# Patient Record
Sex: MALE | Race: WHITE | NOT HISPANIC OR LATINO | Employment: FULL TIME | URBAN - METROPOLITAN AREA
[De-identification: names, ages, dates, MRNs, and addresses within clinical notes are randomized per-mention and may not be internally consistent; named-entity substitution may affect disease eponyms.]

---

## 2018-02-10 ENCOUNTER — TRANSCRIBE ORDERS (OUTPATIENT)
Dept: FAMILY MEDICINE CLINIC | Facility: CLINIC | Age: 38
End: 2018-02-10

## 2020-07-09 ENCOUNTER — HOSPITAL ENCOUNTER (EMERGENCY)
Facility: HOSPITAL | Age: 40
Discharge: HOME | End: 2020-07-09
Attending: PLASTIC SURGERY | Admitting: PLASTIC SURGERY
Payer: OTHER MISCELLANEOUS

## 2020-07-09 VITALS
HEART RATE: 80 BPM | RESPIRATION RATE: 18 BRPM | OXYGEN SATURATION: 98 % | DIASTOLIC BLOOD PRESSURE: 104 MMHG | TEMPERATURE: 97.5 F | SYSTOLIC BLOOD PRESSURE: 169 MMHG | WEIGHT: 240.52 LBS

## 2020-07-09 PROCEDURE — 99283 EMERGENCY DEPT VISIT LOW MDM: CPT

## 2020-07-09 RX ORDER — GINSENG 100 MG
1 CAPSULE ORAL ONCE
Status: COMPLETED | OUTPATIENT
Start: 2020-07-09 | End: 2020-07-09

## 2020-07-09 RX ADMIN — BACITRACIN ZINC 1 SMALL APPLICATION: 500 OINTMENT TOPICAL at 16:42

## 2020-07-09 NOTE — PROGRESS NOTES
The patient was seen in [place] after he had the injured his right postauricular area on piece of duct work they turned around and cut him in the ear  There was no loss of tissue  cartilage was exposed wound was clean and repair this time he will have minimal scarring  He will return to my office on Tuesday 21st of July for follow-up

## 2020-07-09 NOTE — OP NOTE
OPERATIVE REPORT  PATIENT NAME: Isis Larios    :  1980  MRN: [id number]  Pt Location: [facility] Emergency Room    SURGERY DATE:  2020    Surgeons:  DYLAN Gongora B S, P E  Preop and postoperative Diagnosis:  Right postauricular laceration    Operative procedure:  Repair right postauricular laceration    Operative history: At work today a piece of duct work cut him in the right postauricular area creating a 15 mm laceration down to cartilage  There was no loss of tissue  Operative procedure: The patient was placed supine on the table  He was prepped and draped in usual fashion  Anesthesia was local using xylocaine 1% with epinephrine  The wound was clean  Was then  Four interrupted simple sutures  Dressings were applied  Tolerated procedure satisfactory        SIGNATURE: Derrek Koch MD  DATE: 2020  TIME: 4:59 PM

## 2020-07-09 NOTE — ED NOTES
Pt states he was at urgent care today and he received and updated tetanus shot during his visit     Daija Melo RN  07/09/20 6803

## 2020-09-30 ENCOUNTER — TELEMEDICINE (OUTPATIENT)
Dept: FAMILY MEDICINE CLINIC | Facility: CLINIC | Age: 40
End: 2020-09-30
Payer: COMMERCIAL

## 2020-09-30 VITALS — BODY MASS INDEX: 32.51 KG/M2 | HEIGHT: 72 IN | TEMPERATURE: 98.6 F | WEIGHT: 240 LBS

## 2020-09-30 DIAGNOSIS — Z20.828 EXPOSURE TO SARS-ASSOCIATED CORONAVIRUS: Primary | ICD-10-CM

## 2020-09-30 DIAGNOSIS — J40 BRONCHITIS: ICD-10-CM

## 2020-09-30 DIAGNOSIS — Z20.828 EXPOSURE TO SARS-ASSOCIATED CORONAVIRUS: ICD-10-CM

## 2020-09-30 PROCEDURE — 3725F SCREEN DEPRESSION PERFORMED: CPT | Performed by: FAMILY MEDICINE

## 2020-09-30 PROCEDURE — 99214 OFFICE O/P EST MOD 30 MIN: CPT | Performed by: FAMILY MEDICINE

## 2020-09-30 PROCEDURE — U0003 INFECTIOUS AGENT DETECTION BY NUCLEIC ACID (DNA OR RNA); SEVERE ACUTE RESPIRATORY SYNDROME CORONAVIRUS 2 (SARS-COV-2) (CORONAVIRUS DISEASE [COVID-19]), AMPLIFIED PROBE TECHNIQUE, MAKING USE OF HIGH THROUGHPUT TECHNOLOGIES AS DESCRIBED BY CMS-2020-01-R: HCPCS | Performed by: FAMILY MEDICINE

## 2020-09-30 PROCEDURE — 1036F TOBACCO NON-USER: CPT | Performed by: FAMILY MEDICINE

## 2020-09-30 RX ORDER — AZITHROMYCIN 250 MG/1
TABLET, FILM COATED ORAL
Qty: 6 TABLET | Refills: 0 | Status: SHIPPED | OUTPATIENT
Start: 2020-09-30 | End: 2020-10-04

## 2020-09-30 RX ORDER — IBUPROFEN 200 MG
TABLET ORAL EVERY 6 HOURS PRN
COMMUNITY

## 2020-09-30 NOTE — PROGRESS NOTES
Virtual Regular Visit      Assessment/Plan:    Problem List Items Addressed This Visit        Respiratory    Bronchitis    Relevant Medications    azithromycin (ZITHROMAX) 250 mg tablet       Other    Exposure to SARS-associated coronavirus - Primary    Relevant Orders    Novel Coronavirus (COVID-19), PCR [facility] - Collected at [facility] or [facility]          BMI Counseling: Body mass index is 32 55 kg/m²  The BMI is above normal  Nutrition recommendations include decreasing portion sizes  Exercise recommendations include exercising 3-5 times per week  No pharmacotherapy was ordered  Reason for visit is   Chief Complaint   Patient presents with    Cough     PT STARTED YESTERDAY MORNING WITH COUGH, SORE THROAT, HEADACHE, CHEST CONGESTION  AND BODY ACHES   NO FEVER    Virtual Regular Visit        Encounter provider Salina Tai DO    Provider located at [address] [place] [phone]      Recent Visits  No visits were found meeting these conditions  Showing recent visits within past 7 days and meeting all other requirements     Today's Visits  Date Type Provider Dept   09/30/20 Telemedicine DO [facility]   Showing today's visits and meeting all other requirements     Future Appointments  No visits were found meeting these conditions  Showing future appointments within next 150 days and meeting all other requirements        The patient was identified by name and date of birth  Catalina Prior was informed that this is a telemedicine visit and that the visit is being conducted through [facility] and patient was informed that this is a secure, HIPAA-compliant platform  He agrees to proceed     My office door was closed  No one else was in the room  He acknowledged consent and understanding of privacy and security of the video platform   The patient has agreed to participate and understands they can discontinue the visit at any time     Patient is aware this is a billable service  Subjective  Melissa Fabian is a 45 y o  male complains of symptoms starting yesterday, cough, sore throat, fever, body aches         Pt is seen for his initial visit by video visit  He is in his home and I am in my office  He complains of symptoms that started yesterday with a sore throat, cough, body aches  Has a fever-no great  His daughter was sick last week but is now better  The cough has some production of mucus  His appetite is okay  he denies any GI symptoms       History reviewed  No pertinent past medical history  Past Surgical History:   Procedure Laterality Date    SEPTOPLASTY         Current Outpatient Medications   Medication Sig Dispense Refill    ibuprofen (MOTRIN) 200 mg tablet Take by mouth every 6 (six) hours as needed for mild pain      azithromycin (ZITHROMAX) 250 mg tablet Take 2 tablets today then 1 tablet daily x 4 days 6 tablet 0     No current facility-administered medications for this visit  No Known Allergies    Review of Systems   Constitutional: Positive for fatigue and fever  HENT: Positive for congestion and sore throat  Eyes: Negative  Respiratory: Positive for cough  Negative for shortness of breath  Cardiovascular: Negative  Gastrointestinal: Negative  Endocrine: Negative  Genitourinary: Negative  Musculoskeletal: Positive for myalgias  Skin: Negative  Allergic/Immunologic: Negative  Neurological: Negative  Psychiatric/Behavioral: Negative  Video Exam    Vitals:    09/30/20 0805   Temp: 98 6 °F (37 °C)   Weight: 109 kg (240 lb)   Height: 6' (1 829 m)       Physical Exam  Vitals signs and nursing note reviewed  Constitutional:       Appearance: He is well-developed  HENT:      Head: Normocephalic and atraumatic     Eyes:      Conjunctiva/sclera: Conjunctivae normal    Pulmonary:      Effort: Pulmonary effort is normal    Neurological:      Mental Status: He is alert and oriented to person, place, and time  Psychiatric:         Behavior: Behavior normal          Thought Content: Thought content normal          Judgment: Judgment normal           I spent 13 minutes directly with the patient during this visit      Lyndseyfrancisca Burke acknowledges that he has consented to an online visit or consultation  He understands that the online visit is based solely on information provided by him, and that, in the absence of a face-to-face physical evaluation by the physician, the diagnosis he receives is both limited and provisional in terms of accuracy and completeness  This is not intended to replace a full medical face-to-face evaluation by the physician  Kenia Mckeon understands and accepts these terms

## 2020-10-01 LAB — SARS-COV-2 RNA SPEC QL NAA+PROBE: NOT DETECTED

## 2020-11-22 ENCOUNTER — AMB VIDEO VISIT (OUTPATIENT)
Dept: OTHER | Facility: HOSPITAL | Age: 40
End: 2020-11-22

## 2020-11-22 PROCEDURE — EVISIT: Performed by: FAMILY MEDICINE

## 2021-04-20 ENCOUNTER — IMMUNIZATIONS (OUTPATIENT)
Dept: FAMILY MEDICINE CLINIC | Facility: HOSPITAL | Age: 41
End: 2021-04-20

## 2021-04-20 DIAGNOSIS — Z23 ENCOUNTER FOR IMMUNIZATION: Primary | ICD-10-CM

## 2021-04-20 PROCEDURE — 91300 SARS-COV-2 / COVID-19 MRNA VACCINE (PFIZER-BIONTECH) 30 MCG: CPT

## 2021-04-20 PROCEDURE — 0001A SARS-COV-2 / COVID-19 MRNA VACCINE (PFIZER-BIONTECH) 30 MCG: CPT

## 2021-05-15 ENCOUNTER — IMMUNIZATIONS (OUTPATIENT)
Dept: FAMILY MEDICINE CLINIC | Facility: HOSPITAL | Age: 41
End: 2021-05-15

## 2021-05-15 DIAGNOSIS — Z23 ENCOUNTER FOR IMMUNIZATION: Primary | ICD-10-CM

## 2021-05-15 PROCEDURE — 0002A SARS-COV-2 / COVID-19 MRNA VACCINE (PFIZER-BIONTECH) 30 MCG: CPT

## 2021-05-15 PROCEDURE — 91300 SARS-COV-2 / COVID-19 MRNA VACCINE (PFIZER-BIONTECH) 30 MCG: CPT

## 2021-08-29 ENCOUNTER — OFFICE VISIT (OUTPATIENT)
Dept: URGENT CARE | Facility: CLINIC | Age: 41
End: 2021-08-29
Payer: COMMERCIAL

## 2021-08-29 VITALS
OXYGEN SATURATION: 95 % | HEART RATE: 84 BPM | WEIGHT: 230 LBS | HEIGHT: 72 IN | RESPIRATION RATE: 16 BRPM | BODY MASS INDEX: 31.15 KG/M2 | TEMPERATURE: 99.6 F

## 2021-08-29 DIAGNOSIS — B35.3 TINEA PEDIS OF LEFT FOOT: ICD-10-CM

## 2021-08-29 DIAGNOSIS — B34.9 VIRAL INFECTION: Primary | ICD-10-CM

## 2021-08-29 PROCEDURE — U0005 INFEC AGEN DETEC AMPLI PROBE: HCPCS | Performed by: PHYSICIAN ASSISTANT

## 2021-08-29 PROCEDURE — U0003 INFECTIOUS AGENT DETECTION BY NUCLEIC ACID (DNA OR RNA); SEVERE ACUTE RESPIRATORY SYNDROME CORONAVIRUS 2 (SARS-COV-2) (CORONAVIRUS DISEASE [COVID-19]), AMPLIFIED PROBE TECHNIQUE, MAKING USE OF HIGH THROUGHPUT TECHNOLOGIES AS DESCRIBED BY CMS-2020-01-R: HCPCS | Performed by: PHYSICIAN ASSISTANT

## 2021-08-29 PROCEDURE — G0382 LEV 3 HOSP TYPE B ED VISIT: HCPCS | Performed by: PHYSICIAN ASSISTANT

## 2021-08-29 RX ORDER — BENZONATATE 200 MG/1
200 CAPSULE ORAL 3 TIMES DAILY PRN
Qty: 20 CAPSULE | Refills: 0 | Status: SHIPPED | OUTPATIENT
Start: 2021-08-29

## 2021-08-29 NOTE — LETTER
August 29, 2021     Patient: Junior Aquino   YOB: 1980   Date of Visit: 8/29/2021       To Whom it May Concern:    Arturo Pablo was seen in my clinic on 8/29/2021  He may return to work on 09/06/2021  If labs are negative may return sooner  If you have any questions or concerns, please don't hesitate to call  Sincerely,          Linda Pantoja PA-C        CC: No Recipients

## 2021-08-29 NOTE — PATIENT INSTRUCTIONS
use cough medicine as directed   Motrin and/or Tylenol as needed for fevers aches and pains  Follow up with PCP in 3-5 days  Proceed to  ER if symptoms worsen  Cold Symptoms   AMBULATORY CARE:   Cold symptoms  include sneezing, dry throat, a stuffy nose, headache, watery eyes, and a cough  Your cough may be dry, or you may cough up mucus  You may also have muscle aches, joint pain, and tiredness  Rarely, you may have a fever  Cold symptoms occur from inflammation in your upper respiratory system caused by a virus  Most colds go away without treatment  Seek care immediately if:   · You have increased tiredness and weakness  · You are unable to eat  · Your heart is beating much faster than usual for you  · You see white spots in the back of your throat and your neck is swollen and sore to the touch  · You see pinpoint or larger reddish-purple dots on your skin  Contact your healthcare provider if:   · You have a fever higher than 102°F (38 9°C)  · You have new or worsening shortness of breath  · You have thick nasal drainage for more than 2 days  · Your symptoms do not improve or get worse within 5 days  · You have questions or concerns about your condition or care  Treatment for cold symptoms  may include NSAIDS to decrease muscle aches and fever  Cold medicines may also be given to decrease coughing, nasal stuffiness, sneezing, and a runny nose  Manage your cold symptoms: The following may help relieve cold symptoms, such as a dry throat and congestion:  · Gargle with mouthwash or warm salt water as directed  · Suck on throat lozenges or hard candy  · Use a cold or warm vaporizer or humidifier to ease your breathing  · Rest for at least 2 days and then as needed to decrease tiredness and weakness  · Use petroleum based jelly around your nostrils to decrease irritation from blowing your nose  · Drink plenty of liquids   Liquids will help thin and loosen thick mucus so you can cough it up  Liquids will also keep you hydrated  Ask your healthcare provider which liquids are best for you and how much to drink each day  Prevent the spread of germs  by washing your hands often  You can spread your cold germs to others for at least 3 days after your symptoms start  Do not share items, such as eating utensils  Cover your nose and mouth when you cough or sneeze using the crook of your elbow instead of your hands  Throw used tissues in the garbage  Do not smoke:  Smoking may worsen your symptoms and increase the length of time you feel sick  Talk with your healthcare provider if you need help to stop smoking  Follow up with your healthcare provider as directed:  Write down your questions so you remember to ask them during your visits  © Copyright [employer] 2021 Information is for End User's use only and may not be sold, redistributed or otherwise used for commercial purposes  All illustrations and images included in CareNotes® are the copyrighted property of [employer]  or [employer]   The above information is an  only  It is not intended as medical advice for individual conditions or treatments  Talk to your doctor, nurse or pharmacist before following any medical regimen to see if it is safe and effective for you  COVID-19 (Coronavirus Disease 2019)   AMBULATORY CARE:   Coronavirus disease 2019 (COVID-19)  is the disease caused by a coronavirus first discovered in December 2019  Coronaviruses generally cause upper respiratory (nose, throat, and lung) infections, such as a cold  The new virus spreads quickly and easily  The virus can be spread starting 2 days before symptoms even begin  The virus has also changed into several new forms (called variants) since it was discovered  The variants may be more contagious (easily spread) than the original form  Some may also cause more severe illness than others   It is important to follow local, national, and worldwide measures to protect yourself and others from infection  Signs and symptoms of COVID-19  may not develop  Signs and symptoms that develop usually start about 5 days after infection but can take 2 to 14 days  You may feel like you have the flu or a bad cold  Some signs and symptoms go away in a few days  Others can last weeks, months, or possibly years  Information on COVID-19 is still being learned  Tell your healthcare provider if you think you were infected but develop signs or symptoms not listed below:  · A cough    · Shortness of breath or trouble breathing that may become severe    · A fever of at least 100 4°F, or 38°C (may be lower in adults 65 or older)    · Chills that might include shaking    · Muscle pain, body aches, or a headache    · A sore throat    · Suddenly not being able to taste or smell anything    · Feeling mentally and physically tired (fatigue)    · Congestion (stuffy head and nose), or a runny nose    · Diarrhea, nausea, or vomiting    If you think you or someone you know may be infected:  Do the following to protect others:  · If emergency care is needed,  tell the  about the possible infection, or call ahead and tell the emergency department  · Call a healthcare provider  for instructions if symptoms are mild  Anyone who may be infected should not  arrive without calling first  The provider will need to protect staff members and other patients  · The person who may be infected needs to wear a face covering  while getting medical care  This will help lower the risk of infecting others  Coverings are not used for anyone who is younger than 2 years, has breathing problems, or cannot remove it  The provider can give you instructions for anyone who cannot wear a covering      Call your local emergency number [phone] in the [address] [place] [address],[address]) or an emergency department if:   · You have trouble breathing or shortness of breath at rest     · You have chest pain or pressure that lasts longer than 5 minutes  · You become confused or hard to wake  · Your lips or face are blue  · You have a fever of 104°F (40°C) or higher  Call your doctor if:   · You do not  have symptoms of COVID-19 but had close physical contact within 14 days with someone who tested positive  · You have questions or concerns about your condition or care  How COVID-19 is diagnosed: If you think you have COVID-19, call your healthcare provider  He or she will tell you what to do based on your symptoms and testing guidelines in your area  In general, the following may be used:  · A viral test  shows if you have a current infection  Samples are taken from your nose and throat, usually with swabs  You may need to wait several days to get the test results  Your healthcare provider will tell you how to get your results  You will need to quarantine (stay physically away from others) until you get your results  If results show you have COVID-19, you will need to continue until you are well  Your provider or other health official may give you more directions  You will also need to prevent another infection until it is known if you can get COVID-19 again  · An antibody test  shows if you had a past infection  Blood samples are used for this test  Antibodies are made by your immune system to attack the virus that causes COVID-19  Antibodies will form 1 to 3 weeks after you are infected  It is not known if antibodies prevent a second infection, or for how long a person might be protected  If you have antibodies, you will still need to be careful around others until more is known  · CT scans or x-rays  may be used to check for signs of pneumonia  The 2019 coronavirus causes a specific kind of pneumonia, usually in both lungs  The pictures may also be used to check for health problems in other parts of your body      Treatment  such as monoclonal antibodies and convalescent plasma have emergency use authorization (EUA)  This means they may be given only to patients who are hospitalized with severe signs and symptoms  The following may be used to manage your symptoms:  · Mild symptoms  may get better on their own  If you do not need to be treated in a hospital, you will be given instructions to use at home  Your condition will be closely monitored  You will need to watch for worsening symptoms and seek immediate care if needed  Talk to your healthcare provider about the following:    ? Decongestants  help reduce nasal congestion and help you breathe more easily  If you take decongestant pills, they may make you feel restless or cause problems with your sleep  Do not use decongestant sprays for more than a few days  ? Cough suppressants  help reduce coughing  Ask your healthcare provider which type of cough medicine is best for you  ? To soothe a sore throat,  gargle with warm salt water, or use throat lozenges or a throat spray  Drink more liquids to thin and loosen mucus and to prevent dehydration  ? NSAIDs or acetaminophen  can help lower a fever and relieve body aches or a headache  Follow directions  If not taken correctly, NSAIDs can cause kidney damage and acetaminophen can cause liver damage  · Severe or life-threatening symptoms  are treated in the hospital  You may need a combination of the following:    ? Medicines  may be given to lower or prevent inflammation or to fight the virus  You may also need blood thinners to prevent or treat blood clots  If you have a deep vein thrombosis (DVT) or pulmonary embolism (PE), you may need to keep using blood thinners for 3 months  ? Extra oxygen  may be given if you have respiratory failure  This means your lungs cannot get enough oxygen into your blood and out to your organs  Extra oxygen can help prevent organ failure  ? A ventilator  may be used to help you breathe      What you need to know about health problems the virus may cause:  Serious health problems may improve or continue for weeks, months, and possibly years  Health problems that continue may be called long COVID  Anyone can develop serious problems from this virus, but your risk is higher if you are 72 or older  A weak immune system, diabetes, or a heart or lung condition can also increase your risk  Your risk is also higher if you are a current or former cigarette smoker  COVID-19 can lead to any of the following:  · Serious lower respiratory conditions, such as pneumonia or acute respiratory distress syndrome (ARDS)    · Blood vessel damage, leading to blood clots    · Organ damage from a lack of oxygen or from blood clots    · Sleep problems    · Problems thinking clearly, remembering information, or concentrating    · Mood changes, depression, or anxiety    How the 2019 coronavirus spreads: The following are ways the virus is thought to spread, but more information may be coming:  · Droplets are the main way all coronaviruses spread  The virus travels in droplets that form when a person talks, coughs, or sneezes  The droplets can also float in the air for minutes or hours  Infection happens when you breathe in the droplets or get them in your eyes or nose  Close personal contact with an infected person increases your risk for infection  This means being within 6 feet (2 meters) of the person for at least 15 minutes over 24 hours  · Person-to-person contact can spread the virus  For example, a person with the virus on his or her hands can spread it by shaking hands with someone  · The virus can stay on objects and surfaces for a short time  You may become infected by touching the object or surface and then touching your eyes or mouth  · An infected animal may be able to infect a person who touches it  This may happen at live markets or on a farm      Help lower the risk for COVID-19:  The best way to prevent infection is to avoid anyone who is infected, but this can be hard to do  An infected person can spread the virus before signs or symptoms begin, or even if signs or symptoms never develop  The following can help lower the risk for infection:      · Wash your hands often throughout the day  Use soap and water  Rub your soapy hands together, lacing your fingers, for at least 20 seconds  Rinse with warm, running water  Dry your hands with a clean towel or paper towel  Use hand  that contains alcohol if soap and water are not available  Teach children how to wash their hands and use hand   · Cover sneezes and coughs  Turn your face away and cover your mouth and nose with a tissue  Throw the tissue away  Use the bend of your arm if a tissue is not available  Then wash your hands well with soap and water or use hand   Teach children how to cover a cough or sneeze  · Wear a face covering (mask) around anyone who does not live in your home  Use a cloth covering with at least 2 layers  You can also create layers by putting a cloth covering over a disposable non-medical mask  Cover your mouth and your nose  The covering should fit snugly against the bridge of your nose  Securely fasten it under your chin and on the sides of your face  Do not  wear a plastic face shield instead of a covering  Continue social distancing and washing your hands often  A face covering is not a substitute for social distancing safety measures  · Follow worldwide, national, and local social distancing guidelines  Keep at least 6 feet (2 meters) between you and others  Also keep this distance from anyone who comes to your home, such as someone making a delivery  Wear a face covering while you are around others  You will need to wear a covering in restaurants, stores, and other public buildings  You will also need a covering on mass transit, such as a bus, subway, or airplane  Remember to use a covering made from thick material or wear 2 coverings together      · Make a stand away from others  Only allow necessary people into your home  Wear your face covering, and remind others to wear a face covering  Remind them to wash their hands when they arrive and before they leave  Do not  let someone into your home or go to someone's home just to visit  Even if you both do not feel sick, the virus can pass from one of you to the other  · Avoid in-person gatherings and crowds  Gatherings or crowds of 10 or more individuals can cause the virus to spread  Avoid places such as [place], beaches, sporting events, and tourist attractions  For events such as parties, holiday meals, Yazidi services, and conferences, attend virtually (on a computer), if possible  · Ask your healthcare provider for other ways to have appointments  Some providers offer phone, video, or other types of appointments  You may also be able to get prescriptions for a few months of your medicines at a time  · Stay safe if you must go out to work  Keep physical distance between you and other workers as much as possible  Follow your employer's rules so everyone stays safe  If you have COVID-19 and are recovering at home,  healthcare providers will give you specific instructions to follow  The following are general guidelines to remind you how to keep others safe until you are well:  · Wash your hands often  Use soap and water as much as possible  Use hand  that contains alcohol if soap and water are not available  Dry your hands with a clean towel or paper towel  Do not share towels with anyone  If you use paper towels, throw them away in a lined trash can kept in your room or area  Use a covered trash can, if possible  · Do not go out of your home unless it is necessary  Ask someone who is not infected to go out for groceries or supplies, or have them delivered  Do not go to your healthcare provider's office without an appointment      · Only have close physical contact with a person giving direct care, or a baby or child you must care for  Family members and friends should not visit you  If possible, stay in a separate area or room of your home if you live with others  No one should go into the area or room except to give you care  You can visit with others by phone, video chat, e-mail, or similar systems  · Wear a face covering while others are near you  This can help prevent droplets from spreading the virus when you talk, sneeze, or cough  Put the covering on before anyone comes into your room or area  Remind the person to cover his or her nose and mouth before coming in to provide care for you  · Do not share items  Do not share dishes, towels, or other items with anyone  Items need to be washed after you use them  · Protect your baby  Some newborns have tested positive for the virus  It is not known if they became infected before or after birth  The highest risk is when a  has close contact with an infected person  If you are pregnant or breastfeeding, talk to your healthcare provider or obstetrician about any concerns you have  He or she will tell you when to bring your baby in for check-ups and vaccines  He or she will also tell you what to do if you think your baby was infected with the coronavirus  Wash your hands and put on a clean face covering before you breastfeed or care for your baby  · Do not handle live animals unless it is necessary  Some animals, including pets, have been infected with the new coronavirus  Ask someone who is not infected to take care of your pet until you are well  If you must care for a pet, wear a face covering  Wash your hands before and after you give care  Talk to your healthcare provider about how to keep a service animal safe, if needed  · Follow directions from your healthcare provider for being around others after you recover  It is not known if or for how long a recovered person can pass the virus to others   Your provider may give you instructions, such as continuing social distancing and wearing a face covering  He or she will tell you when it is okay to be around others again  This may be 10 to 20 days after symptoms started or you had a positive test  Most symptoms will also need to be gone  Your provider will give you more information  Follow up with your doctor as directed:  Write down your questions so you remember to ask them during your visits  For more information:   · Centers for Disease Control and Prevention  [address] , [address]  Phone: 8- 502 - 591-5164  Web Address: [place]     © [address] [place] [address] 2021 Information is for End User's use only and may not be sold, redistributed or otherwise used for commercial purposes  All illustrations and images included in CareNotes® are the copyrighted property of [employer] D A M , Inc  or [facility] Darren Cuellar   The above information is an  only  It is not intended as medical advice for individual conditions or treatments  Talk to your doctor, nurse or pharmacist before following any medical regimen to see if it is safe and effective for you  Athlete's Foot   WHAT YOU NEED TO KNOW:   Athlete's foot is a foot infection caused by a fungus  DISCHARGE INSTRUCTIONS:   Return to the emergency department if:   · You have a fever or chills  · You have red streaks going up your leg  Contact your healthcare provider if:   · Your infection spreads to other parts of your body  · Your infection is not better in 14 days or is not completely gone in 90 days  · The skin on your foot or leg is red and hot  · You have questions or concerns about your condition or care  Medicines:   · Antifungal medicine  may be given as a cream or pill  You may need a doctor's order for this medicine  Take the medicine until it is gone, even if your feet look like they are healed  · Take your medicine as directed    Contact your healthcare provider if you think your medicine is not helping or if you have side effects  Tell him or her if you are allergic to any medicine  Keep a list of the medicines, vitamins, and herbs you take  Include the amounts, and when and why you take them  Bring the list or the pill bottles to follow-up visits  Carry your medicine list with you in case of an emergency  Follow up with your healthcare provider as directed:  Write down your questions so you remember to ask them during your visits  Prevent the spread of athlete's foot:   · Prevent the spread of this infection to other parts of your body  When you shower, dry your groin area and other parts of your body before you dry your feet  · Keep your feet clean and dry  Wash your feet each day and dry them well, especially between your toes  After your feet are dry, put powder on your feet and between your toes  Wear clean cotton or wool socks each day  Put your socks on first so you do not spread the infection to other areas of your body  Wear sandals, canvas tennis shoes, or other shoes that allow air to flow to your feet  This helps keep your feet dry  Do not use shoes that are tight, or made of plastic or rubber  · Soak your feet in an astringent (drying) solution as directed  if you have blisters  You may need to do this for 20 to 30 minutes, 2 times each day to help dry out the blisters  · Wear shoes in public areas  Wear shower shoes or sandals in warm, damp areas  This includes shower stalls, near swimming pools, and locker rooms  Do not share socks or shoes  Do not use public swimming pools  © Copyright [employer] 2021 Information is for End User's use only and may not be sold, redistributed or otherwise used for commercial purposes  All illustrations and images included in CareNotes® are the copyrighted property of [employer]  or [employer]  The above information is an  only   It is not intended as medical advice for individual conditions or treatments  Talk to your doctor, nurse or pharmacist before following any medical regimen to see if it is safe and effective for you

## 2021-08-29 NOTE — PROGRESS NOTES
[facility] [employer] [facility]        NAME: Chapito Benitez is a 37 y o  male  : 1980    MRN: [id number]  DATE: 2021  TIME: 3:14 PM    Assessment and Plan   Viral infection [B34 9]  1  Viral infection  Novel Coronavirus (Covid-19),PCR [facility] - Office Collection    benzonatate (TESSALON) 200 MG capsule   2  Tinea pedis of left foot           Patient Instructions      use cough medicine as directed   Motrin and/or Tylenol as needed for fevers aches and pains  Follow up with PCP in 3-5 days  Proceed to  ER if symptoms worsen  Chief Complaint     Chief Complaint   Patient presents with    Cough     Monday pedicure, toe infection  Tuesday "athlete's foot", redness, spot on dorsal foot  Since Thursday/friday, cough HS when laying down  No cough during day, sore throat since friday 7/10 with swollowing  Denies fever, n/v/d, loss of taste, appetite, smell, CP, sob, chest pressure/tightness, fatique  Pt vaccinated covid  Denies sick contact or known covid exposure  Pt had pink eye tuesday  History of Present Illness        52-year-old male presents with 2 complaints  First is a cough that started on Friday night  Has been productive from time to time  Worse in the morning  Throat is irritated morning  Denies any loss of smell or taste  Has had occasional headaches  Denies any fevers or chills  No fatigue  Denies any body aches or pain  No abdominal pain nausea vomiting or diarrhea  Second complaint is a rash on left foot  Had a pedicure on Monday them rash rebound foot  Has had similar follow rashes on his foot  Been treating with over-the-counter topicals which has been improving  Denies any numbness or tingling    Cough  This is a new problem  The current episode started in the past 7 days  The problem has been waxing and waning  The problem occurs constantly  The cough is productive of sputum  Associated symptoms include headaches, a rash, rhinorrhea and a sore throat  Pertinent negatives include no chest pain, chills, fever, heartburn, myalgias, nasal congestion, postnasal drip, shortness of breath, weight loss or wheezing  Nothing aggravates the symptoms  He has tried nothing for the symptoms  The treatment provided no relief  Rash  This is a new problem  The current episode started in the past 7 days  The problem has been gradually improving since onset  Location: Leftt dorsal foot  The rash is characterized by pain, redness, itchiness and burning  He was exposed to nothing  Associated symptoms include coughing, rhinorrhea and a sore throat  Pertinent negatives include no congestion, diarrhea, fatigue, fever or shortness of breath  Past treatments include antibiotic cream  The treatment provided moderate relief  Review of Systems   Review of Systems   Constitutional: Negative  Negative for chills, fatigue, fever and weight loss  HENT: Positive for rhinorrhea and sore throat  Negative for congestion and postnasal drip  Eyes: Negative  Respiratory: Positive for cough  Negative for shortness of breath and wheezing  Cardiovascular: Negative  Negative for chest pain  Gastrointestinal: Negative  Negative for diarrhea and heartburn  Musculoskeletal: Negative  Negative for myalgias  Skin: Positive for rash  Neurological: Positive for headaches           Current Medications       Current Outpatient Medications:     benzonatate (TESSALON) 200 MG capsule, Take 1 capsule (200 mg total) by mouth 3 (three) times a day as needed for cough, Disp: 20 capsule, Rfl: 0    ibuprofen (MOTRIN) 200 mg tablet, Take by mouth every 6 (six) hours as needed for mild pain, Disp: , Rfl:     Current Allergies     Allergies as of 08/29/2021    (No Known Allergies)            The following portions of the patient's history were reviewed and updated as appropriate: allergies, current medications, past family history, past medical history, past social history, past surgical history and problem list      History reviewed  No pertinent past medical history  Past Surgical History:   Procedure Laterality Date    SEPTOPLASTY         History reviewed  No pertinent family history  Medications have been verified  Objective   Pulse 84   Temp 99 6 °F (37 6 °C)   Resp 16   Ht 6' (1 829 m)   Wt 104 kg (230 lb)   SpO2 95%   BMI 31 19 kg/m²   No LMP for male patient  Physical Exam     Physical Exam  Vitals and nursing note reviewed  Constitutional:       General: He is not in acute distress  Appearance: He is well-developed  HENT:      Head: Normocephalic and atraumatic  Right Ear: Hearing, tympanic membrane, ear canal and external ear normal       Left Ear: Hearing, tympanic membrane, ear canal and external ear normal       Nose: Nose normal       Mouth/Throat:      Pharynx: Uvula midline  No oropharyngeal exudate  Eyes:      General:         Right eye: No discharge  Left eye: No discharge  Conjunctiva/sclera: Conjunctivae normal    Cardiovascular:      Rate and Rhythm: Normal rate and regular rhythm  Heart sounds: Normal heart sounds  No murmur heard  Pulmonary:      Effort: Pulmonary effort is normal  No respiratory distress  Breath sounds: Normal breath sounds  No wheezing or rales  Abdominal:      General: Bowel sounds are normal       Palpations: Abdomen is soft  Tenderness: There is no abdominal tenderness  Musculoskeletal:         General: Normal range of motion  Cervical back: Normal range of motion and neck supple  Lymphadenopathy:      Cervical: No cervical adenopathy  Skin:     General: Skin is warm and dry  Findings: Rash ( erythematous red rash noted to dorsal aspect of Left foot) present  Neurological:      Mental Status: He is alert and oriented to person, place, and time

## 2021-08-29 NOTE — LETTER
August 29, 2021     Patient: Tanisha Rodrigez   YOB: 1980   Date of Visit: 8/29/2021       To Whom it May Concern:    Nicolas Elena was seen in my clinic on 8/29/2021  He may return to work on 08/06/2021  Patient return sooner if labs are negative  If you have any questions or concerns, please don't hesitate to call  Sincerely,          Dorothy Pantoja PA-C        CC: No Recipients

## 2021-08-30 LAB — SARS-COV-2 RNA RESP QL NAA+PROBE: NEGATIVE

## 2021-09-14 ENCOUNTER — OFFICE VISIT (OUTPATIENT)
Dept: URGENT CARE | Facility: CLINIC | Age: 41
End: 2021-09-14
Payer: COMMERCIAL

## 2021-09-14 VITALS
RESPIRATION RATE: 20 BRPM | TEMPERATURE: 103 F | HEART RATE: 117 BPM | HEIGHT: 72 IN | WEIGHT: 230 LBS | BODY MASS INDEX: 31.15 KG/M2 | OXYGEN SATURATION: 98 %

## 2021-09-14 DIAGNOSIS — J06.9 VIRAL URI: ICD-10-CM

## 2021-09-14 DIAGNOSIS — Z11.59 SPECIAL SCREENING EXAMINATION FOR UNSPECIFIED VIRAL DISEASE: Primary | ICD-10-CM

## 2021-09-14 PROCEDURE — G0382 LEV 3 HOSP TYPE B ED VISIT: HCPCS | Performed by: FAMILY MEDICINE

## 2021-09-14 PROCEDURE — U0003 INFECTIOUS AGENT DETECTION BY NUCLEIC ACID (DNA OR RNA); SEVERE ACUTE RESPIRATORY SYNDROME CORONAVIRUS 2 (SARS-COV-2) (CORONAVIRUS DISEASE [COVID-19]), AMPLIFIED PROBE TECHNIQUE, MAKING USE OF HIGH THROUGHPUT TECHNOLOGIES AS DESCRIBED BY CMS-2020-01-R: HCPCS | Performed by: FAMILY MEDICINE

## 2021-09-14 PROCEDURE — U0005 INFEC AGEN DETEC AMPLI PROBE: HCPCS | Performed by: FAMILY MEDICINE

## 2021-09-14 NOTE — PROGRESS NOTES
[facility] [employer] [facility]        NAME: Devyn Son is a 37 y o  male  : 1980    MRN: [id number]  DATE: 2021  TIME: 11:48 AM    Assessment and Plan   Special screening examination for unspecified viral disease [Z11 59]  1  Special screening examination for unspecified viral disease  Novel Coronavirus (Covid-19),PCR [facility] - Office Collection   2  Viral URI           Patient Instructions       Follow up with PCP in 3-5 days  Proceed to  ER if symptoms worsen  Chief Complaint     Chief Complaint   Patient presents with    Fever     chills body aches x 2 weeks, here 2 wks ago was tested and it was negative, was better and now everything is flaring up again         History of Present Illness         37year-old male reports waking up this morning fevers or chills and body aches  He states that he was tested for COVID-19 2 weeks ago was negative that time and his symptoms since then have resolved  Denies any nausea or vomiting  Denies any loss of taste or smell  Review of Systems   Review of Systems   Constitutional: Positive for chills, fatigue and fever  HENT: Negative  Eyes: Negative  Respiratory: Negative  Cardiovascular: Negative  Gastrointestinal: Negative  Genitourinary: Negative  Musculoskeletal: Positive for arthralgias and myalgias  Skin: Negative  Allergic/Immunologic: Negative  Neurological: Negative  Hematological: Negative  Psychiatric/Behavioral: Negative            Current Medications       Current Outpatient Medications:     ibuprofen (MOTRIN) 200 mg tablet, Take by mouth every 6 (six) hours as needed for mild pain, Disp: , Rfl:     benzonatate (TESSALON) 200 MG capsule, Take 1 capsule (200 mg total) by mouth 3 (three) times a day as needed for cough (Patient not taking: Reported on 2021), Disp: 20 capsule, Rfl: 0    Current Allergies     Allergies as of 2021    (No Known Allergies)            The following portions of the patient's history were reviewed and updated as appropriate: allergies, current medications, past family history, past medical history, past social history, past surgical history and problem list      History reviewed  No pertinent past medical history  Past Surgical History:   Procedure Laterality Date    SEPTOPLASTY         History reviewed  No pertinent family history  Medications have been verified  Objective   Pulse (!) 117   Temp (!) 103 °F (39 4 °C)   Resp 20   Ht 6' (1 829 m)   Wt 104 kg (230 lb)   SpO2 98%   BMI 31 19 kg/m²   No LMP for male patient  Physical Exam     Physical Exam  Constitutional:       Appearance: He is well-developed  HENT:      Head: Normocephalic  Nose: Congestion present  Eyes:      Pupils: Pupils are equal, round, and reactive to light  Cardiovascular:      Rate and Rhythm: Regular rhythm  Tachycardia present  Pulmonary:      Effort: Pulmonary effort is normal    Abdominal:      General: Abdomen is flat  Musculoskeletal:         General: Normal range of motion  Cervical back: Normal range of motion  Skin:     General: Skin is warm  Neurological:      Mental Status: He is alert and oriented to person, place, and time

## 2021-09-15 LAB — SARS-COV-2 RNA RESP QL NAA+PROBE: POSITIVE

## 2022-10-12 PROBLEM — J06.9 VIRAL URI: Status: RESOLVED | Noted: 2021-09-14 | Resolved: 2022-10-12

## 2024-06-10 ENCOUNTER — HOSPITAL ENCOUNTER (EMERGENCY)
Dept: HOSPITAL 99 - EMR | Age: 44
Discharge: HOME | End: 2024-06-10
Payer: COMMERCIAL

## 2024-06-10 VITALS — DIASTOLIC BLOOD PRESSURE: 79 MMHG | RESPIRATION RATE: 18 BRPM | SYSTOLIC BLOOD PRESSURE: 156 MMHG

## 2024-06-10 VITALS — RESPIRATION RATE: 18 BRPM

## 2024-06-10 DIAGNOSIS — S93.402A: Primary | ICD-10-CM
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
## 2024-06-10 PROCEDURE — 99283 EMERGENCY DEPT VISIT LOW MDM: CPT

## 2024-06-10 PROCEDURE — 29515 APPLICATION SHORT LEG SPLINT: CPT

## 2025-01-28 ENCOUNTER — HOSPITAL ENCOUNTER (OUTPATIENT)
Dept: HOSPITAL 99 - MRI | Age: 45
End: 2025-01-28
Payer: COMMERCIAL

## 2025-01-28 DIAGNOSIS — G89.29: ICD-10-CM

## 2025-01-28 DIAGNOSIS — M25.572: Primary | ICD-10-CM

## 2025-03-20 ENCOUNTER — EVALUATION (OUTPATIENT)
Dept: PHYSICAL THERAPY | Facility: CLINIC | Age: 45
End: 2025-03-20
Payer: COMMERCIAL

## 2025-03-20 DIAGNOSIS — M25.572 CHRONIC PAIN OF LEFT ANKLE: ICD-10-CM

## 2025-03-20 DIAGNOSIS — T14.8XXA BONE BRUISE: ICD-10-CM

## 2025-03-20 DIAGNOSIS — G89.29 CHRONIC PAIN OF LEFT ANKLE: ICD-10-CM

## 2025-03-20 DIAGNOSIS — S93.402D SPRAIN OF LEFT ANKLE, UNSPECIFIED LIGAMENT, SUBSEQUENT ENCOUNTER: Primary | ICD-10-CM

## 2025-03-20 PROCEDURE — 97110 THERAPEUTIC EXERCISES: CPT | Performed by: PHYSICAL THERAPIST

## 2025-03-20 PROCEDURE — 97162 PT EVAL MOD COMPLEX 30 MIN: CPT | Performed by: PHYSICAL THERAPIST

## 2025-03-20 NOTE — LETTER
2025    Quinn Parkinson MD  [address] [place] [id number]    Patient: Gabriel Jacques   YOB: 1980   Date of Visit: 3/20/2025     Encounter Diagnosis     ICD-10-CM    1. Sprain of left ankle, unspecified ligament, subsequent encounter  S93.402D       2. Bone bruise  T14.8XXA       3. Chronic pain of left ankle  M25.572     G89.29           Dear Dr. Parkinson:    Thank you for your recent referral of Gabriel Jacques. Please review the attached evaluation summary from Gabriel's recent visit.     Please verify that you agree with the plan of care by signing the attached order.     If you have any questions or concerns, please do not hesitate to call.     I sincerely appreciate the opportunity to share in the care of one of your patients and hope to have another opportunity to work with you in the near future.       Sincerely,    Tasha Chisholm, PT      Referring Provider:      I certify that I have read the below Plan of Care and certify the need for these services furnished under this plan of treatment while under my care.                    Quinn Parkinson MD  [address] [place] [id number]  Via Fax: [phone]          PT Evaluation     Today's date: 3/20/2025  Patient name: Gabriel Jacques  : 1980  MRN: [id number]  Referring provider: Quinn Parkinson MD  Dx:   Encounter Diagnosis     ICD-10-CM    1. Sprain of left ankle, unspecified ligament, subsequent encounter  S93.402D       2. Bone bruise  T14.8XXA       3. Chronic pain of left ankle  M25.572     G89.29                      Assessment  Impairments: abnormal coordination, abnormal gait, abnormal or restricted ROM, activity intolerance, impaired balance, impaired physical strength, lacks appropriate home exercise program, pain with function, weight-bearing intolerance, poor posture , poor body mechanics, unable to perform ADL, participation limitations, activity limitations and endurance  Symptom irritability:  moderate    Assessment details: Pt presents w/ hx hx L lateral ankle sprain w/ medial malleolus tibial bone bruising which initially occurred in June 2024, and then was re-injured in September 2024. He uses ankle brace most of the time when he's on his feet. He exhibits limited L moreso than R ankle DF ROM, contributing to abnormal biomechanics. He presents w/ excess L ankle inversion ROM, as expected. He presents w/ good strength in B LE, though mild deficits are noted on L ankle inversion, knee flexion and extension, and L hip flexion, all of which were likely limited by pain. He demonstrates impaired proprioception on L ankle, contributing to balance impairments. He would benefit from physical therapy to address these impairments and to maximize functional abilities.   Barriers to therapy: Chronicity and multiple injuries to L ankle  Understanding of Dx/Px/POC: good     Prognosis: good    Goals  STG (4 weeks):    Improve L ankle DF ROM to at least 10 degrees.  Decreased L ankle pain to at most 4/10.  Pt will be able to wean from ankle brace for walking community distances w/o ankle pain.    LTG (8-12-weeks):  Pt will be independent w/ HEP.  Pt will be able to navigate stairs w/o L ankle pain.  Decreased L ankle pain to at most 2/10.        Plan  Patient would benefit from: skilled physical therapy  Planned modality interventions: cryotherapy, TENS and thermotherapy: hydrocollator packs    Planned therapy interventions: abdominal trunk stabilization, activity modification, ADL retraining, balance/weight bearing training, balance, body mechanics training, behavior modification, flexibility, gait training, home exercise program, therapeutic exercise, therapeutic activities, stretching, strengthening, postural training, patient/caregiver education, neuromuscular re-education, nerve gliding, muscle pump exercises, motor coordination training, Mcduffie taping, massage, manual therapy, kinesiology taping, joint  mobilization and IASTM    Frequency: 1-2x week  Duration in weeks: 12  Plan of Care beginning date: 3/20/2025  Plan of Care expiration date: 2025  Treatment plan discussed with: patient  Plan details: Pt will be I in HEP and will be able to perform ADLs and work tasks w/ minimal to no L ankle pain.             Subjective Evaluation    History of Present Illness  Mechanism of injury: He had a grade 2 (per pt) L lateral ankle sprain in 2024 - rolled it in a hole in the grass. Saw doc, used brace, etc. and was healing. In September he stopped using brace. The week before thanksgiving he stepped off a curb and tweaked it. By the end of that week he was in brace again. Finally got MRI about a month ago, which showed bone bruise - no tears, per pt. Saw ortho who put him on a bone stimulator (~10 days ago x20 mins/day) and sent to PT. Pain is medial ankle (bone bruise). He goes to chiro 1x/week for general maintenance and to keep up w/ L ankle. Has TENS unit which helps when it's sore. Uses ibuprofen when he needs to. Wearing brace religiously when he's working/doing activity. 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 Prolonged walking hurts. But climbing stairs/ladders hurts, descending worse than ascending.   It's been getting better overall. Pain can be brief or it build up and then sticks around for a while.     Hx of low back and L hip pain which were prior to injury but are not helped due to compensation for ankle.  Quality of life: good    Patient Goals  Patient goals for therapy: decreased pain, improved balance, independence with ADLs/IADLs and return to sport/leisure activities  Patient goal: to have no pain  Pain  Current pain ratin  At best pain ratin  At worst pain ratin  Location: L ankle  Quality: sharp (feels like getting punched)  Relieving factors: medications, heat and ice (TENS; lidocaine patches)          Objective    Palpation:   TTP  L medial malleolus and ines-malleolus    A/PROM:   Left   Right  Knee flexion    WFL   WFL  Knee extension  WFL   WFL    Hip flexion   >90   >90  Hip extension   -10   -10   Hip IR    5*   15  Hip ER    WFL   WFL    Ankle DF   -5/5   0/5   Ankle PF   55   55   Ankle inversion  25   20   Ankle eversion  10   10    Strength:   Left   Right  Knee flex   4+/5*   5-/5  Knee ext   4+/5*   5-/5    Hip flex    4/5*   4+/5  Hip ER    4+/5   4+/5  Hip IR    4+/5   4+/5  Hip ABD   4+/5   4+/5  Hip ext    4/5*   4/5*    Ankle DF   4+/5   4+/5   Ankle PF   >3/5   >3/5   Ankle inversion  4/5*   4+/5   Ankle eversion  4+/5   4+/5     Flexibility:  Quads    +   +  Hamstrings   +   +  GS    +   +    Special Tests:    Valgus Stress   -   -  Varus Stress   +   -  Anterior Drawer   -   -  Posterior Drawer  -   -    Balance:   SLS - mild deficits on L compared to R    Function:   Ambulation - B excess pronation       POC Expires Reeval for Medicare to be completed  Unit Limit Auth Expiration Date PT/OT/STVisit Limit   6/12/25 By visit N/A N/A pending pending    Completed on visit                Precautions: hx L lateral ankle sprain w/ medial malleolus tibial bone bruising    Auth Status DATE 3/20       NA Visit # 1        Remaining        MANUAL THERAPY        Ankle JM        STM                                        THERAPEUTIC EXERCISE HEP        Patient education  Condition and POC       doming x 10x5 sec       GS stretch x 3x30 sec (standing/seated w/strap)                                                                                                                   NEUROMUSCULAR REEDUCATION          SLS x x3                                                                                                                            THERAPEUTIC ACTIVITY                                             GAIT TRAINING                                             MODALITIES

## 2025-03-20 NOTE — PROGRESS NOTES
PT Evaluation     Today's date: 3/20/2025  Patient name: Gabriel Jacques  : 1980  MRN: [id number]  Referring provider: Quinn Parkinson MD  Dx:   Encounter Diagnosis     ICD-10-CM    1. Sprain of left ankle, unspecified ligament, subsequent encounter  S93.402D       2. Bone bruise  T14.8XXA       3. Chronic pain of left ankle  M25.572     G89.29                      Assessment  Impairments: abnormal coordination, abnormal gait, abnormal or restricted ROM, activity intolerance, impaired balance, impaired physical strength, lacks appropriate home exercise program, pain with function, weight-bearing intolerance, poor posture , poor body mechanics, unable to perform ADL, participation limitations, activity limitations and endurance  Symptom irritability: moderate    Assessment details: Pt presents w/ hx hx L lateral ankle sprain w/ medial malleolus tibial bone bruising which initially occurred in 2024, and then was re-injured in 2024. He uses ankle brace most of the time when he's on his feet. He exhibits limited L moreso than R ankle DF ROM, contributing to abnormal biomechanics. He presents w/ excess L ankle inversion ROM, as expected. He presents w/ good strength in B LE, though mild deficits are noted on L ankle inversion, knee flexion and extension, and L hip flexion, all of which were likely limited by pain. He demonstrates impaired proprioception on L ankle, contributing to balance impairments. He would benefit from physical therapy to address these impairments and to maximize functional abilities.   Barriers to therapy: Chronicity and multiple injuries to L ankle  Understanding of Dx/Px/POC: good     Prognosis: good    Goals  STG (4 weeks):    Improve L ankle DF ROM to at least 10 degrees.  Decreased L ankle pain to at most 4/10.  Pt will be able to wean from ankle brace for walking community distances w/o ankle pain.    LTG (8-12-weeks):  Pt will be independent w/ HEP.  Pt will be able  to navigate stairs w/o L ankle pain.  Decreased L ankle pain to at most 2/10.        Plan  Patient would benefit from: skilled physical therapy  Planned modality interventions: cryotherapy, TENS and thermotherapy: hydrocollator packs    Planned therapy interventions: abdominal trunk stabilization, activity modification, ADL retraining, balance/weight bearing training, balance, body mechanics training, behavior modification, flexibility, gait training, home exercise program, therapeutic exercise, therapeutic activities, stretching, strengthening, postural training, patient/caregiver education, neuromuscular re-education, nerve gliding, muscle pump exercises, motor coordination training, Mcduffie taping, massage, manual therapy, kinesiology taping, joint mobilization and IASTM    Frequency: 1-2x week  Duration in weeks: 12  Plan of Care beginning date: 3/20/2025  Plan of Care expiration date: 6/12/2025  Treatment plan discussed with: patient  Plan details: Pt will be I in HEP and will be able to perform ADLs and work tasks w/ minimal to no L ankle pain.             Subjective Evaluation    History of Present Illness  Mechanism of injury: He had a grade 2 (per pt) L lateral ankle sprain in June 2024 - rolled it in a hole in the grass. Saw doc, used brace, etc. and was healing. In September he stopped using brace. The week before thanksgiving he stepped off a curb and tweaked it. By the end of that week he was in brace again. Finally got MRI about a month ago, which showed bone bruise - no tears, per pt. Saw ortho who put him on a bone stimulator (~10 days ago x20 mins/day) and sent to PT. Pain is medial ankle (bone bruise). He goes to chiro 1x/week for general maintenance and to keep up w/ L ankle. Has TENS unit which helps when it's sore. Uses ibuprofen when he needs to. Wearing brace religiously when he's working/doing activity. 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  Prolonged walking hurts. But climbing stairs/ladders hurts, descending worse than ascending.   It's been getting better overall. Pain can be brief or it build up and then sticks around for a while.     Hx of low back and L hip pain which were prior to injury but are not helped due to compensation for ankle.  Quality of life: good    Patient Goals  Patient goals for therapy: decreased pain, improved balance, independence with ADLs/IADLs and return to sport/leisure activities  Patient goal: to have no pain  Pain  Current pain ratin  At best pain ratin  At worst pain ratin  Location: L ankle  Quality: sharp (feels like getting punched)  Relieving factors: medications, heat and ice (TENS; lidocaine patches)          Objective    Palpation:   TTP L medial malleolus and ines-malleolus    A/PROM:   Left   Right  Knee flexion    WFL   WFL  Knee extension  WFL   WFL    Hip flexion   >90   >90  Hip extension   -10   -10   Hip IR    5*   15  Hip ER    WFL   WFL    Ankle DF   -5/5   0/5   Ankle PF   55   55   Ankle inversion  25   20   Ankle eversion  10   10    Strength:   Left   Right  Knee flex   4+/5*   5-/5  Knee ext   4+/5*   5-/5    Hip flex    4/5*   4+/5  Hip ER    4+/5   4+/5  Hip IR    4+/5   4+/5  Hip ABD   4+/5   4+/5  Hip ext    4/5*   4/5*    Ankle DF   4+/5   4+/5   Ankle PF   >3/5   >3/5   Ankle inversion  4/5*   4+/5   Ankle eversion  4+/5   4+/5     Flexibility:  Quads    +   +  Hamstrings   +   +  GS    +   +    Special Tests:    Valgus Stress   -   -  Varus Stress   +   -  Anterior Drawer   -   -  Posterior Drawer  -   -    Balance:   SLS - mild deficits on L compared to R    Function:   Ambulation - B excess pronation       POC Expires Reeval for Medicare to be completed  Unit Limit Auth Expiration Date PT/OT/STVisit Limit   25 By visit N/A N/A pending pending    Completed on visit                Precautions: hx L lateral ankle sprain w/ medial malleolus tibial bone bruising    Auth Status  DATE 3/20       NA Visit # 1        Remaining        MANUAL THERAPY        Ankle JM        STM                                        THERAPEUTIC EXERCISE HEP        Patient education  Condition and POC       doming x 10x5 sec       GS stretch x 3x30 sec (standing/seated w/strap)                                                                                                                   NEUROMUSCULAR REEDUCATION          SLS x x3                                                                                                                            THERAPEUTIC ACTIVITY                                             GAIT TRAINING                                             MODALITIES

## 2025-03-31 ENCOUNTER — OFFICE VISIT (OUTPATIENT)
Dept: PHYSICAL THERAPY | Facility: CLINIC | Age: 45
End: 2025-03-31
Payer: COMMERCIAL

## 2025-03-31 DIAGNOSIS — S93.402D SPRAIN OF LEFT ANKLE, UNSPECIFIED LIGAMENT, SUBSEQUENT ENCOUNTER: Primary | ICD-10-CM

## 2025-03-31 DIAGNOSIS — M25.572 CHRONIC PAIN OF LEFT ANKLE: ICD-10-CM

## 2025-03-31 DIAGNOSIS — G89.29 CHRONIC PAIN OF LEFT ANKLE: ICD-10-CM

## 2025-03-31 DIAGNOSIS — T14.8XXA BONE BRUISE: ICD-10-CM

## 2025-03-31 PROCEDURE — 97110 THERAPEUTIC EXERCISES: CPT

## 2025-03-31 PROCEDURE — 97140 MANUAL THERAPY 1/> REGIONS: CPT

## 2025-03-31 NOTE — PROGRESS NOTES
"Daily Note     Today's date: 3/31/2025  Patient name: Gabriel Jacques  : 1980  MRN: [id number]  Referring provider: Quinn Parkinson MD  Dx:   Encounter Diagnosis     ICD-10-CM    1. Sprain of left ankle, unspecified ligament, subsequent encounter  S93.402D       2. Bone bruise  T14.8XXA       3. Chronic pain of left ankle  M25.572     G89.29           Start Time: 1746  Stop Time: 1830  Total time in clinic (min): 44 minutes    Subjective: Notes that his ankle is sore from work.      Objective: See treatment diary below      Assessment: Focused on foot intrinsic strengthening this visit per tolerance. Mild fatigue demonstrated post session in foot intrinsics and peroneals. Patient would benefit from continued PT.       Plan: Continue per plan of care.        POC Expires Reeval for Medicare to be completed  Unit Limit Auth Expiration Date PT/OT/STVisit Limit   25 By visit N/A N/A pending pending    Completed on visit                Precautions: hx L lateral ankle sprain w/ medial malleolus tibial bone bruising    Auth Status DATE 3/20 3/31      NA Visit # 1 2       Remaining        MANUAL THERAPY        Ankle JM  PROM eversion, DF      STM  LQ  peroneals, PF ant tib                                      THERAPEUTIC EXERCISE HEP        Patient education  Condition and POC       doming x 10x5 sec 15x 5 sec      GS stretch x 3x30 sec (standing/seated w/strap) 3x30 sec (standing/seated w/strap)      Seated Eversion ISO   LO Band 3x5, 5\"      Towel scrunches   2 min      1st met extension   10x      2-3 toe extension   10x                                                                              NEUROMUSCULAR REEDUCATION          SLS x x3 1X3, 10\" w/ doming    1x3 w/o doming                                                                                                                           THERAPEUTIC ACTIVITY                                             GAIT TRAINING                                  "            MODALITIES

## 2025-04-03 ENCOUNTER — OFFICE VISIT (OUTPATIENT)
Dept: PHYSICAL THERAPY | Facility: CLINIC | Age: 45
End: 2025-04-03
Payer: COMMERCIAL

## 2025-04-03 DIAGNOSIS — G89.29 CHRONIC PAIN OF LEFT ANKLE: ICD-10-CM

## 2025-04-03 DIAGNOSIS — T14.8XXA BONE BRUISE: ICD-10-CM

## 2025-04-03 DIAGNOSIS — S93.402D SPRAIN OF LEFT ANKLE, UNSPECIFIED LIGAMENT, SUBSEQUENT ENCOUNTER: Primary | ICD-10-CM

## 2025-04-03 DIAGNOSIS — M25.572 CHRONIC PAIN OF LEFT ANKLE: ICD-10-CM

## 2025-04-03 PROCEDURE — 97140 MANUAL THERAPY 1/> REGIONS: CPT

## 2025-04-03 PROCEDURE — 97110 THERAPEUTIC EXERCISES: CPT

## 2025-04-03 NOTE — PROGRESS NOTES
Daily Note      Today's date: 4/3/2025  Patient name: Gabriel Jacques  : 1980  MRN: [id number]  Referring provider: Quinn Parkinson MD  Dx:   Encounter Diagnosis     ICD-10-CM    1. Sprain of left ankle, unspecified ligament, subsequent encounter  S93.402D       2. Bone bruise  T14.8XXA       3. Chronic pain of left ankle  M25.572     G89.29           Subjective: Pt reports that he is feeling relatively good despite forgetting to wear his brace today. Pt states that he typically wears it while working as a . Pt states that it feels tight today. Pt states that getting out of his truck or getting down from a ladder.        Objective: See treatment diary below    Assessment: Pt tolerated treatment well.  Pt introduced to slantboard gastroc stretch and noted muscle tightness, as well as joint tightness in the anterior talocrural joint.  Pt noted fatigue with eccentric heel raises and mild back pain. Pt provided with verbal cuing to decrease use of hip rocking for momentum. Reviewed HEP to including standing gastrocs stretch at all or step as well as heel raises. Educated pt regarding the role of ankle stiffness on anterior joint pain and pt verbalized understanding.     Plan: Continue per plan of care.           POC Expires Reeval for Medicare to be completed  Unit Limit Auth Expiration Date PT/OT/STVisit Limit   25 By visit N/A N/A 9/15/25 9 visits    Completed on visit                Precautions: hx L lateral ankle sprain w/ medial malleolus tibial bone bruising    Auth Status DATE 3/20 3/31 43     NA Visit # 1 2 3      Remaining        MANUAL THERAPY        Ankle JM  PROM eversion, DF PROM all planes, posterior TCJ glide gr III, distraction, MWM      STM  LQ  peroneals, PF ant tib                                      THERAPEUTIC EXERCISE HEP        Patient education  Condition and POC       doming x 10x5 sec 15x 5 sec 15x5s seated      GS stretch x 3x30 sec (standing/seated w/strap)  "3x30 sec (standing/seated w/strap) 3x30s soleus stretch  3x30s slantboard gastrocs     Seated Eversion ISO   LO Band 3x5, 5\"      Towel scrunches   2 min 2 min     1st met extension   10x 10x      2-3 toe extension   10x 10x digits 2-5 together     Eccentric heel raises    2x10 *fatigue                                                                    NEUROMUSCULAR REEDUCATION          SLS x x3 1X3, 10\" w/ doming    1x3 w/o doming 3x10s w/o doming  3x10s w/ doming                                                                                                                          THERAPEUTIC ACTIVITY                                             GAIT TRAINING                                             MODALITIES                                   "

## 2025-04-07 ENCOUNTER — OFFICE VISIT (OUTPATIENT)
Dept: PHYSICAL THERAPY | Facility: CLINIC | Age: 45
End: 2025-04-07
Payer: COMMERCIAL

## 2025-04-07 DIAGNOSIS — T14.8XXA BONE BRUISE: ICD-10-CM

## 2025-04-07 DIAGNOSIS — S93.402D SPRAIN OF LEFT ANKLE, UNSPECIFIED LIGAMENT, SUBSEQUENT ENCOUNTER: Primary | ICD-10-CM

## 2025-04-07 PROCEDURE — 97110 THERAPEUTIC EXERCISES: CPT

## 2025-04-07 PROCEDURE — 97140 MANUAL THERAPY 1/> REGIONS: CPT

## 2025-04-07 NOTE — PROGRESS NOTES
Daily Note     Today's date: 2025  Patient name: Gabriel Jacques  : 1980  MRN: [id number]  Referring provider: Quinn Parkinson MD  Dx:   Encounter Diagnosis     ICD-10-CM    1. Sprain of left ankle, unspecified ligament, subsequent encounter  S93.402D       2. Bone bruise  T14.8XXA           Start Time: 1615  Stop Time: 1655  Total time in clinic (min): 40 minutes    Subjective: Presents to therapy noting increased soreness on the sides of his foot after last session. States he believes he did too many heel raises and cut them down over the weekend. Reports tightness on the top of his foot.       Objective: See treatment diary below      Assessment:  Patient program performed to tolerance secondary to exacerbation of symptoms following last session. Standing heel raises limited to ten repetitions due to increased ankle irritation. Manual joint mobilizations performed by Quinn Reyes DPT, effective in slight decreased tightness on top of foot. demonstrated fatigue post treatment and would benefit from continued PT      Plan: Continue per plan of care.        POC Expires Reeval for Medicare to be completed  Unit Limit Auth Expiration Date PT/OT/STVisit Limit   25 By visit N/A N/A 9/15/25 9 visits    Completed on visit                Precautions: hx L lateral ankle sprain w/ medial malleolus tibial bone bruising    Auth Status DATE 3/20 3/31 4/3 4/7    NA Visit # 1 2 3 4     Remaining        MANUAL THERAPY        Ankle JM  PROM eversion, DF PROM all planes, posterior TCJ glide gr III, distraction, MWM  PROM all planes JS  Posterior TCJ glide gr III, distraction, MWM DA    STM  LQ  peroneals, PF ant tib                                      THERAPEUTIC EXERCISE HEP        Patient education  Condition and POC       doming x 10x5 sec 15x 5 sec 15x5s seated  15x3''    GS stretch x 3x30 sec (standing/seated w/strap) 3x30 sec (standing/seated w/strap) 3x30s soleus stretch  3x30s slantboard gastrocs 3x30''  "soleus stretch        Seated Eversion ISO   LO Band 3x5, 5\"      Towel scrunches   2 min 2 min 2'    1st met extension   10x 10x  15x    2-3 toe extension   10x 10x digits 2-5 together 15x    Eccentric heel raises    2x10 *fatigue X10 ankle p!                                                                   NEUROMUSCULAR REEDUCATION          SLS x x3 1X3, 10\" w/ doming    1x3 w/o doming 3x10s w/o doming  3x10s w/ doming NV                                                                                                                         THERAPEUTIC ACTIVITY                                             GAIT TRAINING                                             MODALITIES                                    "

## 2025-04-10 ENCOUNTER — OFFICE VISIT (OUTPATIENT)
Dept: PHYSICAL THERAPY | Facility: CLINIC | Age: 45
End: 2025-04-10
Payer: COMMERCIAL

## 2025-04-10 DIAGNOSIS — S93.402D SPRAIN OF LEFT ANKLE, UNSPECIFIED LIGAMENT, SUBSEQUENT ENCOUNTER: Primary | ICD-10-CM

## 2025-04-10 DIAGNOSIS — T14.8XXA BONE BRUISE: ICD-10-CM

## 2025-04-10 DIAGNOSIS — M25.572 CHRONIC PAIN OF LEFT ANKLE: ICD-10-CM

## 2025-04-10 DIAGNOSIS — G89.29 CHRONIC PAIN OF LEFT ANKLE: ICD-10-CM

## 2025-04-10 PROCEDURE — 97110 THERAPEUTIC EXERCISES: CPT | Performed by: PHYSICAL THERAPIST

## 2025-04-10 PROCEDURE — 97140 MANUAL THERAPY 1/> REGIONS: CPT | Performed by: PHYSICAL THERAPIST

## 2025-04-10 PROCEDURE — 97112 NEUROMUSCULAR REEDUCATION: CPT | Performed by: PHYSICAL THERAPIST

## 2025-04-10 NOTE — PROGRESS NOTES
Daily Note     Today's date: 4/10/2025  Patient name: Gabriel Jacques  : 1980  MRN: [id number]  Referring provider: Quinn Parkinson MD  Dx:   Encounter Diagnosis     ICD-10-CM    1. Sprain of left ankle, unspecified ligament, subsequent encounter  S93.402D       2. Bone bruise  T14.8XXA       3. Chronic pain of left ankle  M25.572     G89.29                      Subjective: He was moving some things of heavier weight and he's a bit more sore today. But not too bad. Did a lot of climbing in/out of his truck today and that got to him a bit. Feels he's moving in the right direction though.       Objective: See treatment diary below      Assessment: Tolerated treatment well. Patient would benefit from continued PT. Pt tolerated session. Still quite limited in ankle DF ROM w/o pain, though it is improving. Challenging to do balance/proprio training w/ L LE.       Plan: Continue per plan of care.        POC Expires Reeval for Medicare to be completed  Unit Limit Auth Expiration Date PT/OT/STVisit Limit   25 By visit N/A N/A 9/15/25 9 visits    Completed on visit                Precautions: hx L lateral ankle sprain w/ medial malleolus tibial bone bruising    Auth Status DATE 3/20 3/31 4/3 4/7 4/10   NA Visit # 1 2 3 4 5    Remaining        MANUAL THERAPY        Ankle JM  PROM eversion, DF PROM all planes, posterior TCJ glide gr III, distraction, MWM  PROM all planes JS  Posterior TCJ glide gr III, distraction, MWM DA PROM L ankle t/o    AP glides L tibial on talus G3-4, MWM into DF   STM  LQ  peroneals, PF ant tib                                      THERAPEUTIC EXERCISE HEP        Patient education  Condition and POC       doming x 10x5 sec 15x 5 sec 15x5s seated  15x3'' 10x5 sec   GS stretch x 3x30 sec (standing/seated w/strap) 3x30 sec (standing/seated w/strap) 3x30s soleus stretch  3x30s slantboard gastrocs 3x30'' soleus stretch     2x30'' soleus stretch  2x30'' soleus stretch   Ankle DF MWM w/ TB       "Trialed but DC due to pain   Seated Eversion ISO   LO Band 3x5, 5\"      Towel scrunches   2 min 2 min 2' 2 min   1st met extension   10x 10x  15x 15x   2-3 toe extension   10x 10x digits 2-5 together 15x 15x   Eccentric heel raises    2x10 *fatigue X10 ankle p!    Ankle inversion x     G TB 2x10   Ankle pumps/circles PRN     2x10                                                 NEUROMUSCULAR REEDUCATION          SLS x x3 1X3, 10\" w/ doming    1x3 w/o doming 3x10s w/o doming  3x10s w/ doming NV 5x10s w/ doming   Tandem stance      2x30 sec each    Rocker pose      x30 sec each                                                                                                      THERAPEUTIC ACTIVITY                                             GAIT TRAINING                                             MODALITIES                                      "

## 2025-04-15 ENCOUNTER — OFFICE VISIT (OUTPATIENT)
Dept: PHYSICAL THERAPY | Facility: CLINIC | Age: 45
End: 2025-04-15
Payer: COMMERCIAL

## 2025-04-15 DIAGNOSIS — S93.402D SPRAIN OF LEFT ANKLE, UNSPECIFIED LIGAMENT, SUBSEQUENT ENCOUNTER: Primary | ICD-10-CM

## 2025-04-15 DIAGNOSIS — G89.29 CHRONIC PAIN OF LEFT ANKLE: ICD-10-CM

## 2025-04-15 DIAGNOSIS — M25.572 CHRONIC PAIN OF LEFT ANKLE: ICD-10-CM

## 2025-04-15 DIAGNOSIS — T14.8XXA BONE BRUISE: ICD-10-CM

## 2025-04-15 PROCEDURE — 97110 THERAPEUTIC EXERCISES: CPT | Performed by: PHYSICAL THERAPIST

## 2025-04-15 PROCEDURE — 97140 MANUAL THERAPY 1/> REGIONS: CPT | Performed by: PHYSICAL THERAPIST

## 2025-04-15 PROCEDURE — 97112 NEUROMUSCULAR REEDUCATION: CPT | Performed by: PHYSICAL THERAPIST

## 2025-04-15 NOTE — PROGRESS NOTES
Daily Note     Today's date: 4/15/2025  Patient name: Gabriel Jacques  : 1980  MRN: [id number]  Referring provider: Quinn Parkinson MD  Dx:   Encounter Diagnosis     ICD-10-CM    1. Sprain of left ankle, unspecified ligament, subsequent encounter  S93.402D       2. Bone bruise  T14.8XXA       3. Chronic pain of left ankle  M25.572     G89.29                      Subjective: He was pretty sore aver the weekend. Pain w/ ankle inversion/supination. He's able to move it around better though overall. Did a lot of climbing on trailer and uneven surfaces today.       Objective: See treatment diary below      Assessment: Tolerated treatment well. Patient would benefit from continued PT. Pt tolerated session well overall and had improved mobility post tx. Limited ankle DF though improved w/ WB. However, limited in WB due to bone bruise.      Plan: Continue per plan of care.        POC Expires Reeval for Medicare to be completed  Unit Limit Auth Expiration Date PT/OT/STVisit Limit   25 By visit N/A N/A 9/15/25 9 visits    Completed on visit                Precautions: hx L lateral ankle sprain w/ medial malleolus tibial bone bruising    Auth Status DATE 3/31 4/3 4/7 4/10 4/15   NA Visit # 2 3 4 5 6    Remaining        MANUAL THERAPY        Ankle JM PROM eversion, DF PROM all planes, posterior TCJ glide gr III, distraction, MWM  PROM all planes JS  Posterior TCJ glide gr III, distraction, MWM DA PROM L ankle t/o    AP glides L tibial on talus G3-4, MWM into DF PROM L ankle t/o    AP glides L tibial on talus G3-4, MWM into DF   STM LQ  peroneals, PF ant tib                                       THERAPEUTIC EXERCISE HEP        Patient education         doming x 15x 5 sec 15x5s seated  15x3'' 10x5 sec 10x5 sec seated  10x5 sec standing   GS stretch x 3x30 sec (standing/seated w/strap) 3x30s soleus stretch  3x30s slantboard gastrocs 3x30'' soleus stretch     2x30'' soleus stretch  2x30'' soleus stretch 2x30'' soleus  "stretch  2x30'' gastroc stretch   Ankle DF MWM w/ TB     Trialed but DC due to pain Blue TB 5x5 sec   Seated Eversion ISO  LO Band 3x5, 5\"       Post tib strengthening seated      x10  10x3 sec   Towel scrunches  2 min 2 min 2' 2 min 2 min   1st met extension  10x 10x  15x 15x 2x10   2-3 toe extension  10x 10x digits 2-5 together 15x 15x 2x10   Eccentric heel raises   2x10 *fatigue X10 ankle p!     Ankle inversion x    G TB 2x10 G TB 2x15   Ankle eversion      G TB 2x10   Ankle pumps/circles PRN    2x10  2x10                                                 NEUROMUSCULAR REEDUCATION          SLS x 1X3, 10\" w/ doming    1x3 w/o doming 3x10s w/o doming  3x10s w/ doming NV 5x10s w/ doming 10x10s w/ doming   Tandem stance     2x30 sec each  2x30 sec each    Rocker pose     x30 sec each x30 sec each- DC w/ pain                                                                                                      THERAPEUTIC ACTIVITY                                             GAIT TRAINING                                             MODALITIES                                        "

## 2025-04-17 ENCOUNTER — OFFICE VISIT (OUTPATIENT)
Dept: PHYSICAL THERAPY | Facility: CLINIC | Age: 45
End: 2025-04-17
Payer: COMMERCIAL

## 2025-04-17 DIAGNOSIS — S93.402D SPRAIN OF LEFT ANKLE, UNSPECIFIED LIGAMENT, SUBSEQUENT ENCOUNTER: Primary | ICD-10-CM

## 2025-04-17 DIAGNOSIS — T14.8XXA BONE BRUISE: ICD-10-CM

## 2025-04-17 DIAGNOSIS — G89.29 CHRONIC PAIN OF LEFT ANKLE: ICD-10-CM

## 2025-04-17 DIAGNOSIS — M25.572 CHRONIC PAIN OF LEFT ANKLE: ICD-10-CM

## 2025-04-17 PROCEDURE — 97140 MANUAL THERAPY 1/> REGIONS: CPT

## 2025-04-17 PROCEDURE — 97110 THERAPEUTIC EXERCISES: CPT

## 2025-04-17 NOTE — PROGRESS NOTES
Daily Note      Today's date: 2025  Patient name: Gabriel Jacques  : 1980  MRN: [id number]  Referring provider: Quinn Parkinson MD  Dx:   Encounter Diagnosis     ICD-10-CM    1. Sprain of left ankle, unspecified ligament, subsequent encounter  S93.402D       2. Bone bruise  T14.8XXA       3. Chronic pain of left ankle  M25.572     G89.29           Subjective: Pt reports that his ankle feels a little less painful than last visit.        Objective: See treatment diary below    Assessment: Pt tolerated treatment well. Pt resumed with eccentric heel raises and carefully maintained 10 repetitions only, as he had increased pain ~2 weeks ago when performing 20+ repetitions. Pt continues to present with limited posterior talocrural mobility and notes joint stiffness and achilles tightness with soleus stretching.     Plan: Continue per plan of care.           POC Expires Reeval for Medicare to be completed  Unit Limit Auth Expiration Date PT/OT/STVisit Limit   25 By visit N/A N/A 9/15/25 9 visits    Completed on visit                Precautions: hx L lateral ankle sprain w/ medial malleolus tibial bone bruising    Auth Status DATE 4/3 4/7 4/10 4/15 4/17   NA Visit # 3 4 5 6 7    Remaining        MANUAL THERAPY        Ankle JM PROM all planes, posterior TCJ glide gr III, distraction, MWM  PROM all planes JS  Posterior TCJ glide gr III, distraction, MWM DA PROM L ankle t/o    AP glides L tibial on talus G3-4, MWM into DF PROM L ankle t/o    AP glides L tibial on talus G3-4, MWM into DF PROM L ankle all planes    AP glides L tibial on talus G3-4, MWM into DF   STM                                        THERAPEUTIC EXERCISE HEP        Patient education         doming x 15x5s seated  15x3'' 10x5 sec 10x5 sec seated  10x5 sec standing 10x5s seated  10x5s standing   GS stretch x 3x30s soleus stretch  3x30s slantboard gastrocs 3x30'' soleus stretch     2x30'' soleus stretch  2x30'' soleus stretch 2x30'' soleus  stretch  2x30'' gastroc stretch 2x30s soleus stretch  2x30s gastroc stretch   Ankle DF MWM w/ TB    Trialed but DC due to pain Blue TB 5x5 sec Blue TB 5x5s   Seated Eversion ISO         Post tib strengthening seated     x10  10x3 sec    Towel scrunches  2 min 2' 2 min 2 min 2 min    1st met extension  10x  15x 15x 2x10 2x10   2-3 toe extension  10x digits 2-5 together 15x 15x 2x10 2x10   Eccentric heel raises  2x10 *fatigue X10 ankle p!   10x    Ankle inversion x   G TB 2x10 G TB 2x15 GTB 2x15    Ankle eversion     G TB 2x10 GTB 2x10    Ankle pumps/circles PRN   2x10  2x10  20x ea                                                 NEUROMUSCULAR REEDUCATION          SLS x 3x10s w/o doming  3x10s w/ doming NV 5x10s w/ doming 10x10s w/ doming 10x10s w/ doming   Tandem stance    2x30 sec each  2x30 sec each  2x30s ea    Rocker pose    x30 sec each x30 sec each- DC w/ pain                                                                                                       THERAPEUTIC ACTIVITY                                             GAIT TRAINING                                             MODALITIES

## 2025-04-22 ENCOUNTER — OFFICE VISIT (OUTPATIENT)
Dept: PHYSICAL THERAPY | Facility: CLINIC | Age: 45
End: 2025-04-22
Payer: COMMERCIAL

## 2025-04-22 DIAGNOSIS — M25.572 CHRONIC PAIN OF LEFT ANKLE: ICD-10-CM

## 2025-04-22 DIAGNOSIS — T14.8XXA BONE BRUISE: ICD-10-CM

## 2025-04-22 DIAGNOSIS — S93.402D SPRAIN OF LEFT ANKLE, UNSPECIFIED LIGAMENT, SUBSEQUENT ENCOUNTER: Primary | ICD-10-CM

## 2025-04-22 DIAGNOSIS — G89.29 CHRONIC PAIN OF LEFT ANKLE: ICD-10-CM

## 2025-04-22 PROCEDURE — 97112 NEUROMUSCULAR REEDUCATION: CPT | Performed by: PHYSICAL THERAPIST

## 2025-04-22 PROCEDURE — 97110 THERAPEUTIC EXERCISES: CPT | Performed by: PHYSICAL THERAPIST

## 2025-04-22 NOTE — PROGRESS NOTES
Daily Note     Today's date: 2025  Patient name: Gabriel Jacques  : 1980  MRN: [id number]  Referring provider: Quinn Parkinson MD  Dx:   Encounter Diagnosis     ICD-10-CM    1. Sprain of left ankle, unspecified ligament, subsequent encounter  S93.402D       2. Bone bruise  T14.8XXA       3. Chronic pain of left ankle  M25.572     G89.29                      Subjective: Foot/ankle is feeling pretty good today. Didn't even use his brace and is feeling a bit better.      Objective: See treatment diary below      Assessment: Tolerated treatment well. Patient would benefit from continued PT. Pt is progressing well overall and demonstrates increased ankle DF ROM, w/ improved functional activities such as proprioception and walking.       Plan: Continue per plan of care.        POC Expires Reeval for Medicare to be completed  Unit Limit Auth Expiration Date PT/OT/STVisit Limit   25 By visit N/A N/A 9/15/25 9 visits    Completed on visit                Precautions: hx L lateral ankle sprain w/ medial malleolus tibial bone bruising    Auth Status DATE 4/7 4/10 4/15 4/17 4/22   NA Visit # 4 5 6 7 8    Remaining        MANUAL THERAPY        Ankle JM PROM all planes JS  Posterior TCJ glide gr III, distraction, MWM DA PROM L ankle t/o    AP glides L tibial on talus G3-4, MWM into DF PROM L ankle t/o    AP glides L tibial on talus G3-4, MWM into DF PROM L ankle all planes    AP glides L tibial on talus G3-4, MWM into DF PROM L ankle all planes    AP glides L tibial on talus G3-4, MWM into DF   STM                                        THERAPEUTIC EXERCISE HEP        Patient education         doming x 15x3'' 10x5 sec 10x5 sec seated  10x5 sec standing 10x5s seated  10x5s standing 10x5s seated  10x10s standing   GS stretch x 3x30'' soleus stretch     2x30'' soleus stretch  2x30'' soleus stretch 2x30'' soleus stretch  2x30'' gastroc stretch 2x30s soleus stretch  2x30s gastroc stretch 2x30s soleus stretch  2x30s  gastroc stretch    Strap/incline board    Ankle DF MWM w/ TB   Trialed but DC due to pain Blue TB 5x5 sec Blue TB 5x5s    Seated Eversion ISO         Post tib strengthening seated    x10  10x3 sec     Towel scrunches  2' 2 min 2 min 2 min  2 min    1st met extension  15x 15x 2x10 2x10 2x10x2 sec   2-3 toe extension  15x 15x 2x10 2x10 2x10x2 sec   Eccentric heel raises  X10 ankle p!   10x  3x10   Ankle inversion x  G TB 2x10 G TB 2x15 GTB 2x15  GTB 3x10   Ankle eversion    G TB 2x10 GTB 2x10  GTB 3x10   Ankle pumps/circles PRN  2x10  2x10  20x ea  20x ea    Leg press      60# U/L L calf press 3x10                                       NEUROMUSCULAR REEDUCATION          SLS x NV 5x10s w/ doming 10x10s w/ doming 10x10s w/ doming 10x10s w/ doming   Tandem stance   2x30 sec each  2x30 sec each  2x30s ea     Rocker pose   x30 sec each x30 sec each- DC w/ pain  2x30 sec each   Rocker board      x30 sec each AP/ML                                                                                             THERAPEUTIC ACTIVITY                                             GAIT TRAINING                                             MODALITIES

## 2025-04-24 ENCOUNTER — EVALUATION (OUTPATIENT)
Dept: PHYSICAL THERAPY | Facility: CLINIC | Age: 45
End: 2025-04-24
Payer: COMMERCIAL

## 2025-04-24 DIAGNOSIS — S93.402D SPRAIN OF LEFT ANKLE, UNSPECIFIED LIGAMENT, SUBSEQUENT ENCOUNTER: Primary | ICD-10-CM

## 2025-04-24 DIAGNOSIS — G89.29 CHRONIC PAIN OF LEFT ANKLE: ICD-10-CM

## 2025-04-24 DIAGNOSIS — T14.8XXA BONE BRUISE: ICD-10-CM

## 2025-04-24 DIAGNOSIS — M25.572 CHRONIC PAIN OF LEFT ANKLE: ICD-10-CM

## 2025-04-24 PROCEDURE — 97140 MANUAL THERAPY 1/> REGIONS: CPT | Performed by: PHYSICAL THERAPIST

## 2025-04-24 PROCEDURE — 97110 THERAPEUTIC EXERCISES: CPT | Performed by: PHYSICAL THERAPIST

## 2025-04-24 PROCEDURE — 97112 NEUROMUSCULAR REEDUCATION: CPT | Performed by: PHYSICAL THERAPIST

## 2025-04-24 NOTE — PROGRESS NOTES
PT Evaluation     Today's date: 2025  Patient name: Gabriel Jacques  : 1980  MRN: [id number]  Referring provider: Quinn Parkinson MD  Dx:   Encounter Diagnosis     ICD-10-CM    1. Sprain of left ankle, unspecified ligament, subsequent encounter  S93.402D       2. Bone bruise  T14.8XXA       3. Chronic pain of left ankle  M25.572     G89.29           Start Time: 1700  Stop Time: 1745  Total time in clinic (min): 45 minutes    Assessment/Plan  Gabriel has been an active participant in physical therapy over 9 sessions thus far starting on 3/20/25. During this time he was treated with therapeutic exercise/activity, neuromuscular reeducation, and manual therapy. He has made good progress thus far, as evidenced by improvements in ankle range of motion, lower extremity strength, and reported pain levels. Consequently, patient reports improved ambulatory tolerance and improved ankle stability during ambulation. However, strength remains decreased in LLE vs RLE which affects his endurance with stair and ladder climbing. He would benefit from continued therapy to address all remaining deficits, decrease risk for reinjury, and maximize ability to complete all WBing ADLs.    Impairments: abnormal coordination, abnormal gait, abnormal or restricted ROM, activity intolerance, impaired balance, impaired physical strength, lacks appropriate home exercise program, pain with function, weight-bearing intolerance, poor posture , poor body mechanics, unable to perform ADL, participation limitations, activity limitations and endurance  Symptom irritability: moderate    Prognosis: good    Goals  STG (4 weeks):    Improve L ankle DF ROM to at least 10 degrees.--partially met  Decreased L ankle pain to at most 4/10.--met  Pt will be able to wean from ankle brace for walking community distances w/o ankle pain.--progressing    LTG (8-12-weeks):  Pt will be independent w/ HEP.--met  Pt will be able to navigate stairs w/o L ankle  pain.--progressing  Decreased L ankle pain to at most 2/10.--progressing      Plan  Patient would benefit from: skilled physical therapy  Planned modality interventions: cryotherapy, TENS and thermotherapy: hydrocollator packs    Planned therapy interventions: abdominal trunk stabilization, activity modification, ADL retraining, balance/weight bearing training, balance, body mechanics training, behavior modification, flexibility, gait training, home exercise program, therapeutic exercise, therapeutic activities, stretching, strengthening, postural training, patient/caregiver education, neuromuscular re-education, nerve gliding, muscle pump exercises, motor coordination training, Mcduffie taping, massage, manual therapy, kinesiology taping, joint mobilization and IASTM    Frequency: 1-2x week  Duration in weeks: 12  Plan of Care beginning date: 3/20/2025  Plan of Care expiration date: 6/12/2025   Treatment plan discussed with: patient  Plan details: Pt will be I in HEP and will be able to perform ADLs and work tasks w/ minimal to no L ankle pain.     Subjective    4/24/25 Re-Evaluation  Patient reports good improvement since starting therapy. He reports no limitation with ambulatory distances and denies antalgic gait pattern. He is no longer wearing his ankle brace for any activity outside of work but has not yet tried working without it. He states that he does experience very intermittent moments of instability even when wearing the brace but this has not occurred over the past five days. Climbing ladder/stairs x1 is not challenging. However, when he has to do it multiple times, his ankle will tighten up and become very fatigued. Additionally, patient does not have the ankle mobility early in the AM to descend steps with reciprocal step pattern. However, he is usually able to utilize reciprocal pattern by mid morning.     Pain at best: 0/10  Pain at worst: 3/10  Quality: Generalized tightness and occasional sharp  medial jt line pain.     3/20/25 Evaluation  Mechanism of injury: He had a grade 2 (per pt) L lateral ankle sprain in 2024 - rolled it in a hole in the grass. Saw doc, used brace, etc. and was healing. In September he stopped using brace. The week before thanksgiving he stepped off a curb and tweaked it. By the end of that week he was in brace again. Finally got MRI about a month ago, which showed bone bruise - no tears, per pt. Saw ortho who put him on a bone stimulator (~10 days ago x20 mins/day) and sent to PT. Pain is medial ankle (bone bruise). He goes to chiro 1x/week for general maintenance and to keep up w/ L ankle. Has TENS unit which helps when it's sore. Uses ibuprofen when he needs to. Wearing brace religiously when he's working/doing activity. 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 Prolonged walking hurts. But climbing stairs/ladders hurts, descending worse than ascending.   It's been getting better overall. Pain can be brief or it build up and then sticks around for a while.     Hx of low back and L hip pain which were prior to injury but are not helped due to compensation for ankle.  Quality of life: good    Patient Goals  Patient goals for therapy: decreased pain, improved balance, independence with ADLs/IADLs and return to sport/leisure activities  Patient goal: to have no pain  Pain  Current pain ratin  At best pain ratin  At worst pain ratin  Location: L ankle  Quality: sharp (feels like getting punched)  Relieving factors: medications, heat and ice (TENS; lidocaine patches)      Objective      A/PROM:   Left   Right  Knee flexion    WFL   WFL  Knee extension  WFL   WFL    Hip flexion   >90   >90  Hip extension   -10   -10   Hip IR    10*   15  Hip ER    WFL   WFL    Ankle DF   8/10   0/5   Ankle PF   55   55   Ankle inversion  25   20   Ankle eversion  10   10    Strength:   Left   Right  Knee flex   5/5*   5-/5  Knee  ext   5/5*   5-/5    Hip flex    4/5*   5/5  Hip ER    4+/5   4+/5  Hip IR    4+/5   +4+/5  Hip ABD   4+/5   4+/5  Hip ext    4/5*   4+/5*    Ankle DF   5/5 (4/5 with toes flexed) 5/5   Ankle PF   >3/5   >3/5   Ankle inversion  4+/5   4+/5   Ankle eversion  5/5   4+/5     Flexibility:  Quads    +   +  Hamstrings   +   +  GS    +   +    Special Tests:    Valgus Stress   -   -  Varus Stress   -   -  Anterior Drawer   -   -  Posterior Drawer  -   -    Balance:   SLS - mild deficits on L compared to R    Function:   Ambulation - B excess pronation       POC Expires Reeval for Medicare to be completed  Unit Limit Auth Expiration Date PT/OT/STVisit Limit   6/12/25 By visit N/A N/A 9/15/25 9 visits    Completed on visit                Precautions: hx L lateral ankle sprain w/ medial malleolus tibial bone bruising    Auth Status DATE 4/24 RE 4/10 4/15 4/17 4/22   NA Visit # 9 5 6 7 8    Remaining 0       MANUAL THERAPY        Ankle JM AP glides L tibial on talus G4    Distraction G4 PROM L ankle t/o    AP glides L tibial on talus G3-4, MWM into DF PROM L ankle t/o    AP glides L tibial on talus G3-4, MWM into DF PROM L ankle all planes    AP glides L tibial on talus G3-4, MWM into DF PROM L ankle all planes    AP glides L tibial on talus G3-4, MWM into DF   STM                                        THERAPEUTIC EXERCISE HEP        Patient education         doming x [id number] seated 10x5 sec 10x5 sec seated  10x5 sec standing 10x5s seated  10x5s standing 10x5s seated  10x10s standing   GS stretch x 2x30s gastroc and soleus stretch on slantboard 2x30'' soleus stretch  2x30'' soleus stretch 2x30'' soleus stretch  2x30'' gastroc stretch 2x30s soleus stretch  2x30s gastroc stretch 2x30s soleus stretch  2x30s gastroc stretch    Strap/incline board    Ankle DF MWM w/ TB   Trialed but DC due to pain Blue TB 5x5 sec Blue TB 5x5s    Seated Eversion ISO         Post tib strengthening seated    x10  10x3 sec     Towel scrunches  2 min 2  min 2 min 2 min  2 min    1st met extension  2x10x2 sec 15x 2x10 2x10 2x10x2 sec   2-3 toe extension  2x10x2 sec 15x 2x10 2x10 2x10x2 sec   Eccentric heel raises  3x10 c ball between ankles   10x  3x10   Ankle inversion x nv G TB 2x10 G TB 2x15 GTB 2x15  GTB 3x10   Ankle eversion  nv  G TB 2x10 GTB 2x10  GTB 3x10   Ankle pumps/circles PRN  2x10  2x10  20x ea  20x ea    Leg press  nv    60# U/L L calf press 3x10                                       NEUROMUSCULAR REEDUCATION          SLS x nv 5x10s w/ doming 10x10s w/ doming 10x10s w/ doming 10x10s w/ doming   Tandem stance   2x30 sec each  2x30 sec each  2x30s ea     Rocker pose  2x30 sec ea x30 sec each x30 sec each- DC w/ pain  2x30 sec each   Rocker board  60 sec ea AP/ML    x30 sec each AP/ML                                                                                             THERAPEUTIC ACTIVITY                                             GAIT TRAINING                                             MODALITIES

## 2025-04-29 ENCOUNTER — OFFICE VISIT (OUTPATIENT)
Dept: PHYSICAL THERAPY | Facility: CLINIC | Age: 45
End: 2025-04-29
Payer: COMMERCIAL

## 2025-04-29 DIAGNOSIS — G89.29 CHRONIC PAIN OF LEFT ANKLE: ICD-10-CM

## 2025-04-29 DIAGNOSIS — T14.8XXA BONE BRUISE: ICD-10-CM

## 2025-04-29 DIAGNOSIS — S93.402D SPRAIN OF LEFT ANKLE, UNSPECIFIED LIGAMENT, SUBSEQUENT ENCOUNTER: Primary | ICD-10-CM

## 2025-04-29 DIAGNOSIS — M25.572 CHRONIC PAIN OF LEFT ANKLE: ICD-10-CM

## 2025-04-29 PROCEDURE — 97110 THERAPEUTIC EXERCISES: CPT

## 2025-04-29 PROCEDURE — 97140 MANUAL THERAPY 1/> REGIONS: CPT

## 2025-04-29 PROCEDURE — 97112 NEUROMUSCULAR REEDUCATION: CPT

## 2025-04-29 NOTE — PROGRESS NOTES
Daily Note      Today's date: 2025  Patient name: Gabriel Jacques  : 1980  MRN: [id number]  Referring provider: Quinn Parkinson MD  Dx:   Encounter Diagnosis     ICD-10-CM    1. Sprain of left ankle, unspecified ligament, subsequent encounter  S93.402D       2. Bone bruise  T14.8XXA       3. Chronic pain of left ankle  M25.572     G89.29           Subjective: Pt reports that he began foregoing his ankle brace since  and his ankle is feeling strong. 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 Pt states that his ankle felt sore and tender, but felt more confident after knowing nothing major happened. Pt states there also was an instance in which his ankle began rolling, without weight through it, but was able to recover without pain. Pt reports that he still is at least moderately fearful about rolling it again. Pt also states that he tweaked his back the other day after performing a backward bending stretch while leaning back toward his car, followed by a forward bend. Pt states he then got stuck trying to return to upright.        Objective: See treatment diary below    Assessment: Pt tolerated treatment well.  Pt demonstrates improving confidence and good understanding of strengthening of the left ankle with decreased dependence on the ankle brace. Pt was educated on signs and symptoms requiring him to scale back on activity - such as soreness lasting more than 2 days after activity. Pt continues to present with hypomobility in the posterior direction in the TCJ, which is improving overall. Pt noted improving performance with SLS initially, likely due to improved neuromuscular control, followed by declining performance in later trials due to fatigue at the end of the session.     Plan: Continue per plan of care.           POC Expires Reeval for Medicare to be completed  Unit Limit Auth Expiration Date PT/OT/STVisit Limit    6/12/25 By visit N/A N/A 10/21/25 12 visits    Completed on visit                Precautions: hx L lateral ankle sprain w/ medial malleolus tibial bone bruising    Auth Status DATE 4/24 RE 4/29 4/17 4/22   NA Visit # 9 1  7 8    Remaining 0 11      MANUAL THERAPY        Ankle JM AP glides L tibial on talus G4    Distraction G4 AP glides L tibial on talus G4    Distraction G4  PROM L ankle all planes    AP glides L tibial on talus G3-4, MWM into DF PROM L ankle all planes    AP glides L tibial on talus G3-4, MWM into DF   STM                                        THERAPEUTIC EXERCISE HEP        Patient education         doming x [id number] seated [id number] seated  10x5s seated  10x5s standing 10x5s seated  10x10s standing   GS stretch x 2x30s gastroc and soleus stretch on slantboard 2x30s gastroc and soleus stretch on slantboard  2x30s soleus stretch  2x30s gastroc stretch 2x30s soleus stretch  2x30s gastroc stretch    Strap/incline board    Ankle DF MWM w/ TB     Blue TB 5x5s    Seated Eversion ISO         Post tib strengthening seated         Towel scrunches  2 min   2 min  2 min    1st met extension  2x10x2 sec 2x10x2 sec   2x10 2x10x2 sec   2-3 toe extension  2x10x2 sec 2x10x2 sec   2x10 2x10x2 sec   Eccentric heel raises  3x10 c ball between ankles 3x10 w/ ball between ankles  10x  3x10   Ankle inversion x nv GTB 3x10   GTB 2x15  GTB 3x10   Ankle eversion  nv GTB 3x10   GTB 2x10  GTB 3x10   Ankle pumps/circles PRN    20x ea  20x ea    Leg press  nv 60# U/L L calf press 3x10   60# U/L L calf press 3x10                                       NEUROMUSCULAR REEDUCATION          SLS x nv 10x10s w/ doming  10x10s w/ doming 10x10s w/ doming   Tandem stance     2x30s ea     Rocker pose  2x30 sec ea    2x30 sec each   Rocker board  60 sec ea AP/ML    x30 sec each AP/ML                                                                                             THERAPEUTIC ACTIVITY                                              GAIT TRAINING                                             MODALITIES

## 2025-05-01 ENCOUNTER — OFFICE VISIT (OUTPATIENT)
Dept: PHYSICAL THERAPY | Facility: CLINIC | Age: 45
End: 2025-05-01
Payer: COMMERCIAL

## 2025-05-01 DIAGNOSIS — M25.572 CHRONIC PAIN OF LEFT ANKLE: ICD-10-CM

## 2025-05-01 DIAGNOSIS — T14.8XXA BONE BRUISE: ICD-10-CM

## 2025-05-01 DIAGNOSIS — G89.29 CHRONIC PAIN OF LEFT ANKLE: ICD-10-CM

## 2025-05-01 DIAGNOSIS — S93.402D SPRAIN OF LEFT ANKLE, UNSPECIFIED LIGAMENT, SUBSEQUENT ENCOUNTER: Primary | ICD-10-CM

## 2025-05-01 PROCEDURE — 97140 MANUAL THERAPY 1/> REGIONS: CPT | Performed by: PHYSICAL THERAPIST

## 2025-05-01 PROCEDURE — 97110 THERAPEUTIC EXERCISES: CPT | Performed by: PHYSICAL THERAPIST

## 2025-05-01 NOTE — PROGRESS NOTES
Daily Note     Today's date: 2025  Patient name: Gabriel Jacques  : 1980  MRN: [id number]  Referring provider: Quinn Parkinson MD  Dx:   Encounter Diagnosis     ICD-10-CM    1. Sprain of left ankle, unspecified ligament, subsequent encounter  S93.402D       2. Bone bruise  T14.8XXA       3. Chronic pain of left ankle  M25.572     G89.29                      Subjective: Still gets sore from working but it's much improved. He gets occasional sharp pains shooting in R lateral heel or occasionally elsewhere. He feels stronger in his foot now and feels less worried about it rolling and being weak. Still feels a bit sore when climbing up/down on stuff.       Objective: See treatment diary below      Assessment: Tolerated treatment well. Patient would benefit from continued PT. Pt continues to improve overall w/ increasing endurance and activity tolerance. Has some discomfort w/ loaded ankle DF, though improving ROM overall.       Plan: Continue per plan of care.        POC Expires Reeval for Medicare to be completed  Unit Limit Auth Expiration Date PT/OT/STVisit Limit   25 By visit N/A N/A 10/21/25 12 visits    Completed on visit                Precautions: hx L lateral ankle sprain w/ medial malleolus tibial bone bruising    Auth Status DATE  RE    NA Visit # 9 1 2  8    Remaining 0 11 10     MANUAL THERAPY        Ankle JM AP glides L tibial on talus G4    Distraction G4 AP glides L tibial on talus G4    Distraction G4 AP glides L tibial on talus G4    Distraction G4  PROM L ankle all planes    AP glides L tibial on talus G3-4, MWM into DF   STM                                        THERAPEUTIC EXERCISE HEP        Patient education         doming x [id number] seated [id number] seated 10x10 sec standing  10x5s seated  10x10s standing   GS stretch x 2x30s gastroc and soleus stretch on slantboard 2x30s gastroc and soleus stretch on slantboard 2x30s gastroc and soleus stretch on slantboard   2x30s soleus stretch  2x30s gastroc stretch    Strap/incline board    Ankle DF MWM w/ TB         Seated Eversion ISO         Post tib strengthening seated         Towel scrunches  2 min    2 min    1st met extension  2x10x2 sec 2x10x2 sec  2x10x2 sec   2x10x2 sec   2-3 toe extension  2x10x2 sec 2x10x2 sec  2x10x2 sec   2x10x2 sec   Eccentric heel raises  3x10 c ball between ankles 3x10 w/ ball between ankles 3x10 w/ towel roll  between ankles  3x10   Ankle inversion x nv GTB 3x10  GTB 2x15   GTB 3x10   Ankle eversion  nv GTB 3x10  GTB 2x15   GTB 3x10   Ankle pumps/circles PRN     20x ea    Leg press  nv 60# U/L L calf press 3x10 60# U/L L calf press 3x10  60# U/L L calf press 3x10                                       NEUROMUSCULAR REEDUCATION          SLS x nv 10x10s w/ doming 5x10s w/ doming  10x10s w/ doming   Tandem stance         Rocker pose  2x30 sec ea    2x30 sec each   Rocker board  60 sec ea AP/ML  60 sec ea AP/ML  x30 sec each AP/ML                                                                                             THERAPEUTIC ACTIVITY                                             GAIT TRAINING                                             MODALITIES

## 2025-05-06 ENCOUNTER — OFFICE VISIT (OUTPATIENT)
Dept: PHYSICAL THERAPY | Facility: CLINIC | Age: 45
End: 2025-05-06
Payer: COMMERCIAL

## 2025-05-06 DIAGNOSIS — M25.572 CHRONIC PAIN OF LEFT ANKLE: ICD-10-CM

## 2025-05-06 DIAGNOSIS — S93.402D SPRAIN OF LEFT ANKLE, UNSPECIFIED LIGAMENT, SUBSEQUENT ENCOUNTER: Primary | ICD-10-CM

## 2025-05-06 DIAGNOSIS — G89.29 CHRONIC PAIN OF LEFT ANKLE: ICD-10-CM

## 2025-05-06 DIAGNOSIS — T14.8XXA BONE BRUISE: ICD-10-CM

## 2025-05-06 PROCEDURE — 97140 MANUAL THERAPY 1/> REGIONS: CPT

## 2025-05-06 PROCEDURE — 97110 THERAPEUTIC EXERCISES: CPT

## 2025-05-06 NOTE — PROGRESS NOTES
Daily Note     Today's date: 2025  Patient name: Gabriel Jacques  : 1980  MRN: [id number]  Referring provider: Quinn Parkinson MD  Dx:   Encounter Diagnosis     ICD-10-CM    1. Sprain of left ankle, unspecified ligament, subsequent encounter  S93.402D       2. Bone bruise  T14.8XXA       3. Chronic pain of left ankle  M25.572     G89.29           Start Time: 1615  Stop Time: 1645  Total time in clinic (min): 30 minutes    Subjective: Pt reports he is doing about the same. No new chief complaints or concerns noted upon arrival.       Objective: See treatment diary below      Assessment: Tolerated treatment well. Completed ex's as indicated below with no noted increased pain or discomfort noted. Continue a few more sessions then discuss possible D/C.  Patient demonstrated fatigue post treatment, exhibited good technique with therapeutic exercises, and would benefit from continued PT      Plan: Continue per plan of care.        POC Expires Reeval for Medicare to be completed  Unit Limit Auth Expiration Date PT/OT/STVisit Limit   25 By visit N/A N/A 10/21/25 12 visits    Completed on visit                Precautions: hx L lateral ankle sprain w/ medial malleolus tibial bone bruising    Auth Status DATE  RE     NA Visit # 9 1 2 3     Remaining 0 11 10 9    MANUAL THERAPY        Ankle JM AP glides L tibial on talus G4    Distraction G4 AP glides L tibial on talus G4    Distraction G4 AP glides L tibial on talus G4    Distraction G4 AP glides L tibial on talus G4    Distraction G4    STM                                        THERAPEUTIC EXERCISE HEP        Patient education         doming x [id number] seated [id number] seated 10x10 sec standing 10s 10 sec standing    GS stretch x 2x30s gastroc and soleus stretch on slantboard 2x30s gastroc and soleus stretch on slantboard 2x30s gastroc and soleus stretch on slantboard 2x30s gastroc and soleus stretch on slandboard    Ankle DF MWM w/ TB          Seated Eversion ISO         Post tib strengthening seated         Towel scrunches  2 min       1st met extension  2x10x2 sec 2x10x2 sec  2x10x2 sec  2x10x2 sec    2-3 toe extension  2x10x2 sec 2x10x2 sec  2x10x2 sec  2x10x2 sec     Eccentric heel raises  3x10 c ball between ankles 3x10 w/ ball between ankles 3x10 w/ towel roll  between ankles 3x10 w/ towel roll between ankles    Ankle inversion x nv GTB 3x10  GTB 2x15  GTB 2x15    Ankle eversion  nv GTB 3x10  GTB 2x15  GTB 2x15    Ankle pumps/circles PRN        Leg press  nv 60# U/L L calf press 3x10 60# U/L L calf press 3x10 65# U/L L calf press 3x10                                        NEUROMUSCULAR REEDUCATION          SLS x nv 10x10s w/ doming 5x10s w/ doming 5x10s w/ doming    Tandem stance         Rocker pose  2x30 sec ea       Rocker board  60 sec ea AP/ML  60 sec ea AP/ML 60 sec ea AP/ML                                                                                              THERAPEUTIC ACTIVITY                                             GAIT TRAINING                                             MODALITIES

## 2025-05-08 ENCOUNTER — OFFICE VISIT (OUTPATIENT)
Dept: PHYSICAL THERAPY | Facility: CLINIC | Age: 45
End: 2025-05-08
Payer: COMMERCIAL

## 2025-05-08 DIAGNOSIS — G89.29 CHRONIC PAIN OF LEFT ANKLE: ICD-10-CM

## 2025-05-08 DIAGNOSIS — T14.8XXA BONE BRUISE: ICD-10-CM

## 2025-05-08 DIAGNOSIS — M25.572 CHRONIC PAIN OF LEFT ANKLE: ICD-10-CM

## 2025-05-08 DIAGNOSIS — S93.402D SPRAIN OF LEFT ANKLE, UNSPECIFIED LIGAMENT, SUBSEQUENT ENCOUNTER: Primary | ICD-10-CM

## 2025-05-08 PROCEDURE — 97140 MANUAL THERAPY 1/> REGIONS: CPT | Performed by: PHYSICAL THERAPIST

## 2025-05-08 PROCEDURE — 97110 THERAPEUTIC EXERCISES: CPT | Performed by: PHYSICAL THERAPIST

## 2025-05-08 NOTE — PROGRESS NOTES
"Daily Note     Today's date: 2025  Patient name: Gabriel Jacques  : 1980  MRN: [id number]  Referring provider: Quinn Parkinson MD  Dx:   Encounter Diagnosis     ICD-10-CM    1. Sprain of left ankle, unspecified ligament, subsequent encounter  S93.402D       2. Bone bruise  T14.8XXA       3. Chronic pain of left ankle  M25.572     G89.29                      Subjective: He had been feeling good but today it felt sore in his ankle all around the sides and front and feels like it's \"floppy\". He reports it doesn't feel unstable but just floppy. 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      Objective: See treatment diary below      Assessment: Tolerated treatment well. Patient would benefit from continued PT. Pt tolerated session well overall in spite of increased fatigue today. Challenging to do post tib strengthening in seated.       Plan: Continue per plan of care.        POC Expires Reeval for Medicare to be completed  Unit Limit Auth Expiration Date PT/OT/STVisit Limit   25 By visit N/A N/A 10/21/25 12 visits    Completed on visit                Precautions: hx L lateral ankle sprain w/ medial malleolus tibial bone bruising    Auth Status DATE  RE    NA Visit # 9 1 2 3 4    Remaining 0 11 10 9 8   MANUAL THERAPY        Ankle JM AP glides L tibial on talus G4    Distraction G4 AP glides L tibial on talus G4    Distraction G4 AP glides L tibial on talus G4    Distraction G4 AP glides L tibial on talus G4    Distraction G4 AP glides L tibial on talus G2-3    Distraction G2-3    L great toe extension MWM w/ distraction   STM     L GS                                   THERAPEUTIC EXERCISE HEP        Patient education         doming x [id number] seated [id number] seated 10x10 sec standing 10s 10 sec standing 10x10 sec    GS stretch x 2x30s gastroc and soleus stretch on slantboard 2x30s gastroc and soleus stretch on slantboard 2x30s gastroc and soleus stretch on " slantboard 2x30s gastroc and soleus stretch on slandboard 3x30 sec longsit w/ strap   Ankle DF MWM w/ TB         Seated Eversion ISO         Post tib strengthening seated      G TB x10   Towel scrunches  2 min       1st met extension  2x10x2 sec 2x10x2 sec  2x10x2 sec  2x10x2 sec [id number]    2-3 toe extension  2x10x2 sec 2x10x2 sec  2x10x2 sec  2x10x2 sec  [id number]    Eccentric heel raises  3x10 c ball between ankles 3x10 w/ ball between ankles 3x10 w/ towel roll  between ankles 3x10 w/ towel roll between ankles 3x10 w/ towel roll between ankles   Ankle inversion x nv GTB 3x10  GTB 2x15  GTB 2x15 G TB 3x15   Ankle eversion  nv GTB 3x10  GTB 2x15  GTB 2x15 G TB 3x15   Ankle pumps/circles PRN        Leg press  nv 60# U/L L calf press 3x10 60# U/L L calf press 3x10 65# U/L L calf press 3x10 65# U/L L calf press 3x10                                       NEUROMUSCULAR REEDUCATION          SLS x nv 10x10s w/ doming 5x10s w/ doming 5x10s w/ doming 5x10s w/ doming   Tandem stance         Rocker pose  2x30 sec ea       Rocker board  60 sec ea AP/ML  60 sec ea AP/ML 60 sec ea AP/ML 60 sec ea AP/ML   Ladder drills       NV                                                                                    THERAPEUTIC ACTIVITY                                             GAIT TRAINING                                             MODALITIES

## 2025-05-13 ENCOUNTER — OFFICE VISIT (OUTPATIENT)
Dept: PHYSICAL THERAPY | Facility: CLINIC | Age: 45
End: 2025-05-13
Payer: COMMERCIAL

## 2025-05-13 DIAGNOSIS — S93.402D SPRAIN OF LEFT ANKLE, UNSPECIFIED LIGAMENT, SUBSEQUENT ENCOUNTER: Primary | ICD-10-CM

## 2025-05-13 DIAGNOSIS — G89.29 CHRONIC PAIN OF LEFT ANKLE: ICD-10-CM

## 2025-05-13 DIAGNOSIS — M25.572 CHRONIC PAIN OF LEFT ANKLE: ICD-10-CM

## 2025-05-13 DIAGNOSIS — T14.8XXA BONE BRUISE: ICD-10-CM

## 2025-05-13 PROCEDURE — 97110 THERAPEUTIC EXERCISES: CPT

## 2025-05-13 PROCEDURE — 97140 MANUAL THERAPY 1/> REGIONS: CPT

## 2025-05-13 NOTE — PROGRESS NOTES
"Daily Note      Today's date: 2025  Patient name: Gabriel Jacques  : 1980  MRN: [id number]  Referring provider: Quinn Parkinson MD  Dx:   Encounter Diagnosis     ICD-10-CM    1. Sprain of left ankle, unspecified ligament, subsequent encounter  S93.402D       2. Bone bruise  T14.8XXA       3. 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 Pt states that on , he stepped down a step and had a sudden \"crack\" in the left ankle and suspected that it was just from stiffness. Pt states that he then had soreness in the front of the ankle. Pt states that pain has been as much as a 7/10.        Objective: See treatment diary below    Assessment: Pt tolerated treatment well.  Pt resumed with step lunge for dorsiflexion and noted tension in the anterior TCJ. Pt noted left knee pain limiting this exercise on 8\" step. When increasing to 8+6\" step, pt noted more tension in the TCJ compared to the knee. Pt also introduced to eccentric forward step downs and noted no pain, but slightly limited control with stepping down, but pain when stepping backward with the left.     Plan: Continue per plan of care.           POC Expires Reeval for Medicare to be completed  Unit Limit Auth Expiration Date PT/OT/STVisit Limit   25 By visit N/A N/A 10/21/25 12 visits    Completed on visit                Precautions: hx L lateral ankle sprain w/ medial malleolus tibial bone bruising    Auth Status DATE  5/   NA Visit # 1 2 3 4 5    Remaining 11 10 9 8 7   MANUAL THERAPY        Ankle JM AP glides L tibial on talus G4    Distraction G4 AP glides L tibial on talus G4    Distraction G4 AP glides L tibial on talus G4    Distraction G4 AP glides L tibial on talus G2-3    Distraction G2-3    L great toe extension MWM w/ distraction AP glides L tibial on talus G3-4    Distraction G3-4   STM    L GS         " Received phone call from Chata with [facility] requesting additional information regarding patients arrival complaint. Requesting information be faxed over for review with provider. Fax number [phone]. This was completed at this time.   "                           THERAPEUTIC EXERCISE HEP        Patient education         doming x [id number] seated 10x10 sec standing 10s 10 sec standing 10x10 sec  10x10s standing    GS stretch x 2x30s gastroc and soleus stretch on slantboard 2x30s gastroc and soleus stretch on slantboard 2x30s gastroc and soleus stretch on slandboard 3x30 sec longsit w/ strap 3x30s gastroc and soleus stretch on slantboard   Ankle DF MWM w/ TB      3x on 8\" step *L knee pain    15x on 8+6\" steps    Seated Eversion ISO         Post tib strengthening seated     G TB x10 2x10 GTB    Towel scrunches         1st met extension  2x10x2 sec  2x10x2 sec  2x10x2 sec [id number]  3x5x5 sec   2-3 toe extension  2x10x2 sec  2x10x2 sec  2x10x2 sec  [id number]  3x5x5 sec   Eccentric heel raises  3x10 w/ ball between ankles 3x10 w/ towel roll  between ankles 3x10 w/ towel roll between ankles 3x10 w/ towel roll between ankles 3x10 w/ towel roll between ankles   Ankle inversion x GTB 3x10  GTB 2x15  GTB 2x15 G TB 3x15 GTB 3x15   Ankle eversion  GTB 3x10  GTB 2x15  GTB 2x15 G TB 3x15 GTB 3x15    Ankle pumps/circles PRN        Leg press  60# U/L L calf press 3x10 60# U/L L calf press 3x10 65# U/L L calf press 3x10 65# U/L L calf press 3x10    Forward step downs      2x10 6\" step LLE                              NEUROMUSCULAR REEDUCATION          SLS x 10x10s w/ doming 5x10s w/ doming 5x10s w/ doming 5x10s w/ doming    Tandem stance         Rocker pose         Botanical Tans board   60 sec ea AP/ML 60 sec ea AP/ML 60 sec ea AP/ML    Ladder drills      NV                                                                                     THERAPEUTIC ACTIVITY                                             GAIT TRAINING                                             MODALITIES                                             "

## 2025-05-15 ENCOUNTER — OFFICE VISIT (OUTPATIENT)
Dept: PHYSICAL THERAPY | Facility: CLINIC | Age: 45
End: 2025-05-15
Payer: COMMERCIAL

## 2025-05-15 DIAGNOSIS — G89.29 CHRONIC PAIN OF LEFT ANKLE: ICD-10-CM

## 2025-05-15 DIAGNOSIS — S93.402D SPRAIN OF LEFT ANKLE, UNSPECIFIED LIGAMENT, SUBSEQUENT ENCOUNTER: Primary | ICD-10-CM

## 2025-05-15 DIAGNOSIS — T14.8XXA BONE BRUISE: ICD-10-CM

## 2025-05-15 DIAGNOSIS — M25.572 CHRONIC PAIN OF LEFT ANKLE: ICD-10-CM

## 2025-05-15 PROCEDURE — 97110 THERAPEUTIC EXERCISES: CPT | Performed by: PHYSICAL THERAPIST

## 2025-05-15 PROCEDURE — 97140 MANUAL THERAPY 1/> REGIONS: CPT | Performed by: PHYSICAL THERAPIST

## 2025-05-15 PROCEDURE — 97112 NEUROMUSCULAR REEDUCATION: CPT | Performed by: PHYSICAL THERAPIST

## 2025-05-15 NOTE — PROGRESS NOTES
"Daily Note     Today's date: 5/15/2025  Patient name: Gabriel Jacques  : 1980  MRN: [id number]  Referring provider: Quinn Parkinson MD  Dx:   Encounter Diagnosis     ICD-10-CM    1. Sprain of left ankle, unspecified ligament, subsequent encounter  S93.402D       2. Bone bruise  T14.8XXA       3. Chronic pain of left ankle  M25.572     G89.29                      Subjective: Pt felt better after last session and after seeing the chiropractor. Feels good today.       Objective: See treatment diary below      Assessment: Tolerated treatment well. Patient would benefit from continued PT. Pt tolerated step downs for ankle, though was somewhat limited by knee pain. Did well w/ agility drills. Occasional pain w/ L WB.      Plan: Continue per plan of care.        POC Expires Reeval for Medicare to be completed  Unit Limit Auth Expiration Date PT/OT/STVisit Limit   25 By visit N/A N/A 10/21/25 12 visits    Completed on visit                Precautions: hx L lateral ankle sprain w/ medial malleolus tibial bone bruising    Auth Status DATE 5/1 5/6 5/8 5/13 5/15   NA Visit # 2 3 4 5 6    Remaining 10 9 8 7 6   MANUAL THERAPY        Ankle JM AP glides L tibial on talus G4    Distraction G4 AP glides L tibial on talus G4    Distraction G4 AP glides L tibial on talus G2-3    Distraction G2-3    L great toe extension MWM w/ distraction AP glides L tibial on talus G3-4    Distraction G3-4 AP glides L tibial on talus G3-4    Distraction G3-5 (pt consent)    L great toe extension MWM w/ distraction   STM   L GS                                     THERAPEUTIC EXERCISE HEP        Patient education         doming x 10x10 sec standing 10s 10 sec standing 10x10 sec  10x10s standing  10x10s standing    GS stretch x 2x30s gastroc and soleus stretch on slantboard 2x30s gastroc and soleus stretch on slandboard 3x30 sec longsit w/ strap 3x30s gastroc and soleus stretch on slantboard    Ankle DF MWM w/ TB     3x on 8\" step *L knee " "pain    15x on 8+6\" steps  M TB 10x3 sec    15x on 8+6\" steps    Seated Eversion ISO         Post tib strengthening seated    G TB x10 2x10 GTB  2x10 GTB    Towel scrunches         1st met extension  2x10x2 sec  2x10x2 sec [id number]  3x5x5 sec 2x5x5 sec   2-3 toe extension  2x10x2 sec  2x10x2 sec  [id number]  3x5x5 sec 2x5x5 sec   Eccentric heel raises  3x10 w/ towel roll  between ankles 3x10 w/ towel roll between ankles 3x10 w/ towel roll between ankles 3x10 w/ towel roll between ankles    Ankle inversion x GTB 2x15  GTB 2x15 G TB 3x15 GTB 3x15 BTB 3x15   Ankle eversion  GTB 2x15  GTB 2x15 G TB 3x15 GTB 3x15  BTB 3x15   Ankle pumps/circles PRN        Leg press  60# U/L L calf press 3x10 65# U/L L calf press 3x10 65# U/L L calf press 3x10     Forward step downs     2x10 6\" step LLE x10 6\" step LLE                              NEUROMUSCULAR REEDUCATION          SLS x 5x10s w/ doming 5x10s w/ doming 5x10s w/ doming     Tandem stance         Rocker pose         Rocker board  60 sec ea AP/ML 60 sec ea AP/ML 60 sec ea AP/ML  60 sec ea AP/ML   Ladder drills     NV  Various drills, various speeds                                                                                    THERAPEUTIC ACTIVITY                                             GAIT TRAINING                                             MODALITIES                                              "

## 2025-05-20 ENCOUNTER — OFFICE VISIT (OUTPATIENT)
Dept: PHYSICAL THERAPY | Facility: CLINIC | Age: 45
End: 2025-05-20
Payer: COMMERCIAL

## 2025-05-20 DIAGNOSIS — G89.29 CHRONIC PAIN OF LEFT ANKLE: ICD-10-CM

## 2025-05-20 DIAGNOSIS — M25.572 CHRONIC PAIN OF LEFT ANKLE: ICD-10-CM

## 2025-05-20 DIAGNOSIS — S93.402D SPRAIN OF LEFT ANKLE, UNSPECIFIED LIGAMENT, SUBSEQUENT ENCOUNTER: Primary | ICD-10-CM

## 2025-05-20 DIAGNOSIS — T14.8XXA BONE BRUISE: ICD-10-CM

## 2025-05-20 PROCEDURE — 97112 NEUROMUSCULAR REEDUCATION: CPT | Performed by: PHYSICAL THERAPIST

## 2025-05-20 PROCEDURE — 97110 THERAPEUTIC EXERCISES: CPT | Performed by: PHYSICAL THERAPIST

## 2025-05-20 PROCEDURE — 97140 MANUAL THERAPY 1/> REGIONS: CPT | Performed by: PHYSICAL THERAPIST

## 2025-05-20 NOTE — PROGRESS NOTES
"Daily Note     Today's date: 2025  Patient name: Gabriel Jacques  : 1980  MRN: [id number]  Referring provider: Quinn Parkinson MD  Dx:   Encounter Diagnosis     ICD-10-CM    1. Sprain of left ankle, unspecified ligament, subsequent encounter  S93.402D       2. Bone bruise  T14.8XXA       3. Chronic pain of left ankle  M25.572     G89.29                      Subjective: Is feeling great! Just occasionally achy in his ankle.       Objective: See treatment diary below      Assessment: Tolerated treatment well. Patient would benefit from continued PT. Pt continues to progress well overall and tolerated agility drills well. No pain t/o session in L ankle.       Plan: Continue per plan of care.        POC Expires Reeval for Medicare to be completed  Unit Limit Auth Expiration Date PT/OT/STVisit Limit   25 By visit N/A N/A 10/21/25 12 visits    Completed on visit                Precautions: hx L lateral ankle sprain w/ medial malleolus tibial bone bruising    Auth Status DATE 5/6 5/8 5/13 5/15 5/20   NA Visit # 3 4 5 6 7    Remaining 9 8 7 6 5   MANUAL THERAPY        Ankle JM AP glides L tibial on talus G4    Distraction G4 AP glides L tibial on talus G2-3    Distraction G2-3    L great toe extension MWM w/ distraction AP glides L tibial on talus G3-4    Distraction G3-4 AP glides L tibial on talus G3-4    Distraction G3-5 (pt consent)    L great toe extension MWM w/ distraction AP glides L tibial on talus G3-4    Distraction G3-5 (pt consent)    L great toe extension MWM w/ distraction   STM  L GS                                      THERAPEUTIC EXERCISE HEP        Patient education         doming x 10s 10 sec standing 10x10 sec  10x10s standing  10x10s standing     GS stretch x 2x30s gastroc and soleus stretch on slandboard 3x30 sec longsit w/ strap 3x30s gastroc and soleus stretch on slantboard     Ankle DF MWM w/ TB    3x on 8\" step *L knee pain    15x on 8+6\" steps  M TB 10x3 sec    15x on 8+6\" " "steps  15x on 8+6\" steps    Seated Eversion ISO         Post tib strengthening seated   G TB x10 2x10 GTB  2x10 GTB  2x15x2 sec B TB    Towel scrunches         1st met extension  2x10x2 sec [id number]  3x5x5 sec 2x5x5 sec    2-3 toe extension  2x10x2 sec  [id number]  3x5x5 sec 2x5x5 sec    Eccentric heel raises  3x10 w/ towel roll between ankles 3x10 w/ towel roll between ankles 3x10 w/ towel roll between ankles     Ankle inversion x GTB 2x15 G TB 3x15 GTB 3x15 BTB 3x15 BTB 3x15   Ankle eversion  GTB 2x15 G TB 3x15 GTB 3x15  BTB 3x15 BTB 3x15   Ankle pumps/circles PRN        Leg press  65# U/L L calf press 3x10 65# U/L L calf press 3x10      Forward step downs    2x10 6\" step LLE x10 6\" step LLE x10 6\" step LLE                              NEUROMUSCULAR REEDUCATION          SLS x 5x10s w/ doming 5x10s w/ doming   5x10s w/ doming   Tandem stance      2x30 sec each   Rocker pose      x30 sec each   Rocker board  60 sec ea AP/ML 60 sec ea AP/ML  60 sec ea AP/ML 60 sec ea AP/ML   Ladder drills    NV  Various drills, various speeds Various drills, various speeds                                                                                    THERAPEUTIC ACTIVITY                                             GAIT TRAINING                                             MODALITIES                                                "

## 2025-05-22 ENCOUNTER — APPOINTMENT (OUTPATIENT)
Dept: PHYSICAL THERAPY | Facility: CLINIC | Age: 45
End: 2025-05-22
Payer: COMMERCIAL

## 2025-05-27 ENCOUNTER — OFFICE VISIT (OUTPATIENT)
Dept: PHYSICAL THERAPY | Facility: CLINIC | Age: 45
End: 2025-05-27
Payer: COMMERCIAL

## 2025-05-27 DIAGNOSIS — S93.402D SPRAIN OF LEFT ANKLE, UNSPECIFIED LIGAMENT, SUBSEQUENT ENCOUNTER: Primary | ICD-10-CM

## 2025-05-27 DIAGNOSIS — M25.572 CHRONIC PAIN OF LEFT ANKLE: ICD-10-CM

## 2025-05-27 DIAGNOSIS — G89.29 CHRONIC PAIN OF LEFT ANKLE: ICD-10-CM

## 2025-05-27 DIAGNOSIS — T14.8XXA BONE BRUISE: ICD-10-CM

## 2025-05-27 PROCEDURE — 97140 MANUAL THERAPY 1/> REGIONS: CPT

## 2025-05-27 PROCEDURE — 97110 THERAPEUTIC EXERCISES: CPT

## 2025-05-27 PROCEDURE — 97112 NEUROMUSCULAR REEDUCATION: CPT

## 2025-05-27 NOTE — PROGRESS NOTES
Daily Note      Today's date: 2025  Patient name: Gabriel Jacques  : 1980  MRN: [id number]  Referring provider: Quinn Parkinson MD  Dx:   Encounter Diagnosis     ICD-10-CM    1. Sprain of left ankle, unspecified ligament, subsequent encounter  S93.402D       2. Bone bruise  T14.8XXA       3. Chronic pain of left ankle  M25.572     G89.29           Subjective: Pt reports that he was able to have a day of rest and relaxation yesterday, which has been rare lately.        Objective: See treatment diary below    Assessment: Pt tolerated treatment well. Pt demonstrates overall improved tolerance for closed-chain ankle dorsiflexion and forward step downs, but continues to note some limitation due to left knee pain. Pt was educated in new agility ladder patterns and when pushing off on the LLE while traveling to the right, he noted mild anterior ankle pain. Pt notes that after decreasing treatment frequency over the past week, his ankle is still holding up well and pt agreed to continuing at 1x/week over the next 2-3 weeks prior to being discharged to [facility]. This was also discussed and agreed upon by primary PT.     Plan: Continue per plan of care.           POC Expires Reeval for Medicare to be completed  Unit Limit Auth Expiration Date PT/OT/STVisit Limit   25 By visit N/A N/A 10/21/25 12 visits    Completed on visit                Precautions: hx L lateral ankle sprain w/ medial malleolus tibial bone bruising    Auth Status DATE 5/8 5/13 5/15 5/20 5/27   NA Visit # 4 5 6 7 8    Remaining 8 7 6 5 4   MANUAL THERAPY        Ankle JM AP glides L tibial on talus G2-3    Distraction G2-3    L great toe extension MWM w/ distraction AP glides L tibial on talus G3-4    Distraction G3-4 AP glides L tibial on talus G3-4    Distraction G3-5 (pt consent)    L great toe extension MWM w/ distraction AP glides L tibial on talus G3-4    Distraction G3-5 (pt consent)    L great toe extension MWM w/ distraction AP glides  "L tibial on talus G3-4    Distraction G3-5 (pt consent)    L great toe extension MWM w/ distraction   STM L GS                                       THERAPEUTIC EXERCISE HEP        Patient education         doming x 10x10 sec  10x10s standing  10x10s standing      GS stretch x 3x30 sec longsit w/ strap 3x30s gastroc and soleus stretch on slantboard      Ankle DF MWM w/ TB   3x on 8\" step *L knee pain    15x on 8+6\" steps  M TB 10x3 sec    15x on 8+6\" steps  15x on 8+6\" steps  15x on 8+6\" steps    Seated Eversion ISO         Post tib strengthening seated  G TB x10 2x10 GTB  2x10 GTB  2x15x2 sec B TB  2x15x2 sec BTB    Towel scrunches         1st met extension  [id number]  3x5x5 sec 2x5x5 sec     2-3 toe extension  [id number]  3x5x5 sec 2x5x5 sec     Eccentric heel raises  3x10 w/ towel roll between ankles 3x10 w/ towel roll between ankles      Ankle inversion x G TB 3x15 GTB 3x15 BTB 3x15 BTB 3x15 BTB 3x15   Ankle eversion  G TB 3x15 GTB 3x15  BTB 3x15 BTB 3x15 BTB 3x15   Ankle pumps/circles PRN        Leg press  65# U/L L calf press 3x10       Forward step downs   2x10 6\" step LLE x10 6\" step LLE x10 6\" step LLE 2x10 6\" LLE                               NEUROMUSCULAR REEDUCATION          SLS x 5x10s w/ doming   5x10s w/ doming 5x10s w/ doming    Tandem stance     2x30 sec each 2x30 sec ea    Rocker pose     x30 sec each X30 sec ea    Rocker board  60 sec ea AP/ML  60 sec ea AP/ML 60 sec ea AP/ML 60 sec ea AP/ML   Ladder drills   NV  Various drills, various speeds Various drills, various speeds X10' various patterns (added in-in-out-out forward and laterally)                                                                                    THERAPEUTIC ACTIVITY                                             GAIT TRAINING                                             MODALITIES                                                  "

## 2025-05-29 ENCOUNTER — APPOINTMENT (OUTPATIENT)
Dept: PHYSICAL THERAPY | Facility: CLINIC | Age: 45
End: 2025-05-29
Payer: COMMERCIAL

## 2025-06-05 ENCOUNTER — OFFICE VISIT (OUTPATIENT)
Dept: PHYSICAL THERAPY | Facility: CLINIC | Age: 45
End: 2025-06-05
Payer: COMMERCIAL

## 2025-06-05 DIAGNOSIS — S93.402D SPRAIN OF LEFT ANKLE, UNSPECIFIED LIGAMENT, SUBSEQUENT ENCOUNTER: Primary | ICD-10-CM

## 2025-06-05 DIAGNOSIS — T14.8XXA BONE BRUISE: ICD-10-CM

## 2025-06-05 PROCEDURE — 97110 THERAPEUTIC EXERCISES: CPT

## 2025-06-05 PROCEDURE — 97112 NEUROMUSCULAR REEDUCATION: CPT

## 2025-06-05 PROCEDURE — 97140 MANUAL THERAPY 1/> REGIONS: CPT

## 2025-06-05 NOTE — PROGRESS NOTES
Daily Note      Today's date: 2025  Patient name: Gabriel Jacques  : 1980  MRN: [id number]  Referring provider: Quinn Parkinson MD  Dx:   Encounter Diagnosis     ICD-10-CM    1. Sprain of left ankle, unspecified ligament, subsequent encounter  S93.402D       2. Bone bruise  T14.8XXA             Subjective: Gabriel reports L ankle feels fatigued following work. Denies pain throughout work and home activities. Feels ready to discharged next visit.        Objective: See treatment diary below    Assessment: Gabriel tolerated PT treatment well. Continued to address DF ROM with self mobilization and passive stretching, range improving. L knee pain does limited forward step down, however able to complete 2 sets. Added B/L hop to agility ladder patterns, denied ankle provocation throughout and post session. Anticipate discharge to HEP [place].        Plan: Continue per plan of care. Discharge to [facility].           POC Expires Reeval for Medicare to be completed  Unit Limit Auth Expiration Date PT/OT/STVisit Limit   25 By visit N/A N/A 10/21/25 12 visits    Completed on visit                Precautions: hx L lateral ankle sprain w/ medial malleolus tibial bone bruising    Auth Status DATE 5/8 5/13 5/15 5/20 5/27 6/5   NA Visit # 4 5 6 7 8 9    Remaining 8 7 6 5 4 3   MANUAL THERAPY         Ankle JM AP glides L tibial on talus G2-3    Distraction G2-3    L great toe extension MWM w/ distraction AP glides L tibial on talus G3-4    Distraction G3-4 AP glides L tibial on talus G3-4    Distraction G3-5 (pt consent)    L great toe extension MWM w/ distraction AP glides L tibial on talus G3-4    Distraction G3-5 (pt consent)    L great toe extension MWM w/ distraction AP glides L tibial on talus G3-4    Distraction G3-5 (pt consent)    L great toe extension MWM w/ distraction AP glides L tibial on talus G3-4    Distraction G3-5 (pt consent)    L great toe extension MWM w/ distraction   STM L GS                               "              THERAPEUTIC EXERCISE HEP         Patient education          doming x 10x10 sec  10x10s standing  10x10s standing                 GS stretch x 3x30 sec longsit w/ strap 3x30s gastroc and soleus stretch on slantboard       Ankle DF MWM w/ TB   3x on 8\" step *L knee pain    15x on 8+6\" steps  M TB 10x3 sec    15x on 8+6\" steps  15x on 8+6\" steps  15x on 8+6\" steps  15x on 8+6\" steps    Seated Eversion ISO          Post tib strengthening seated  G TB x10 2x10 GTB  2x10 GTB  2x15x2 sec B TB  2x15x2 sec BTB  2x15x2 sec BTB    Towel scrunches          1st met extension  [id number]  3x5x5 sec 2x5x5 sec      2-3 toe extension  [id number]  3x5x5 sec 2x5x5 sec      Eccentric heel raises  3x10 w/ towel roll between ankles 3x10 w/ towel roll between ankles       Ankle inversion x G TB 3x15 GTB 3x15 BTB 3x15 BTB 3x15 BTB 3x15 BTB 3x15   Ankle eversion  G TB 3x15 GTB 3x15  BTB 3x15 BTB 3x15 BTB 3x15 BTB 3x15   Ankle pumps/circles PRN         Leg press  65# U/L L calf press 3x10        Forward step downs   2x10 6\" step LLE x10 6\" step LLE x10 6\" step LLE 2x10 6\" LLE  2x10 6\" LLE                                  NEUROMUSCULAR REEDUCATION           SLS x 5x10s w/ doming   5x10s w/ doming 5x10s w/ doming  5x10s w/ doming    Tandem stance     2x30 sec each 2x30 sec ea  2x30 sec ea    Rocker pose     x30 sec each X30 sec ea  X30 sec ea    Rocker board  60 sec ea AP/ML  60 sec ea AP/ML 60 sec ea AP/ML 60 sec ea AP/ML 60 sec ea AP/ML   Ladder drills   NV  Various drills, various speeds Various drills, various speeds X10' various patterns (added in-in-out-out forward and laterally) X10' various patterns (added in-in-out-out forward and laterally)                                                                                             THERAPEUTIC ACTIVITY                                                  GAIT TRAINING                                                  MODALITIES                                                     "

## 2025-06-12 ENCOUNTER — OFFICE VISIT (OUTPATIENT)
Dept: PHYSICAL THERAPY | Facility: CLINIC | Age: 45
End: 2025-06-12
Payer: COMMERCIAL

## 2025-06-12 DIAGNOSIS — S93.402D SPRAIN OF LEFT ANKLE, UNSPECIFIED LIGAMENT, SUBSEQUENT ENCOUNTER: Primary | ICD-10-CM

## 2025-06-12 DIAGNOSIS — T14.8XXA BONE BRUISE: ICD-10-CM

## 2025-06-12 DIAGNOSIS — M25.572 CHRONIC PAIN OF LEFT ANKLE: ICD-10-CM

## 2025-06-12 DIAGNOSIS — G89.29 CHRONIC PAIN OF LEFT ANKLE: ICD-10-CM

## 2025-06-12 PROCEDURE — 97140 MANUAL THERAPY 1/> REGIONS: CPT

## 2025-06-12 PROCEDURE — 97110 THERAPEUTIC EXERCISES: CPT

## 2025-06-12 PROCEDURE — 97112 NEUROMUSCULAR REEDUCATION: CPT

## 2025-06-12 NOTE — PROGRESS NOTES
"Daily Note      Today's date: 2025  Patient name: Gabriel Jacques  : 1980  MRN: [id number]  Referring provider: Quinn Parkinson MD  Dx:   Encounter Diagnosis     ICD-10-CM    1. Sprain of left ankle, unspecified ligament, subsequent encounter  S93.402D       2. Bone bruise  T14.8XXA       3. Chronic pain of left ankle  M25.572     G89.29           Subjective: Pt reports that he has been doing a lot of work and has even done a lot of overtime. Pt states \"my ankle is probably holding up the best right now compared to the rest of my body.\"        Objective: See treatment diary below    Assessment: Pt tolerated treatment well. Educated pt in home exercise program and pt has the appropriate resistance and equipment necessary to perform this program. Pt was educated regarding HEP and tapering and pt verbalized good understanding.     Goals  STG (4 weeks):    Improve L ankle DF ROM to at least 10 degrees.--GOAL MET  Decreased L ankle pain to at most 4/10.--GOAL MET  Pt will be able to wean from ankle brace for walking community distances w/o ankle pain.--GOAL MET    LTG (8-12-weeks):  Pt will be independent w/ HEP.--GOAL MET  Pt will be able to navigate stairs w/o L ankle pain.- GOAL MET  Decreased L ankle pain to at most 2/10. GOAL MET    Plan: Discharge from PT.           POC Expires Reeval for Medicare to be completed  Unit Limit Auth Expiration Date PT/OT/STVisit Limit   25 By visit N/A N/A 10/21/25 12 visits    Completed on visit                Precautions: hx L lateral ankle sprain w/ medial malleolus tibial bone bruising    Auth Status DATE     NA Visit # 7 8 9 10     Remaining 5 4 3 2    MANUAL THERAPY        Ankle JM AP glides L tibial on talus G3-4    Distraction G3-5 (pt consent)    L great toe extension MWM w/ distraction AP glides L tibial on talus G3-4    Distraction G3-5 (pt consent)    L great toe extension MWM w/ distraction AP glides L tibial on talus " "G3-4    Distraction G3-5 (pt consent)    L great toe extension MWM w/ distraction AP glides L tibial on talus G3-4    Distraction G3-4     L great toe extension MWM w/ distraction    STM                                        THERAPEUTIC EXERCISE HEP        Patient education         doming x                 GS stretch x        Ankle DF MWM w/ TB  15x on 8+6\" steps  15x on 8+6\" steps  15x on 8+6\" steps  15x on chair     Seated Eversion ISO         Post tib strengthening seated  2x15x2 sec B TB  2x15x2 sec BTB  2x15x2 sec BTB      Towel scrunches         1st met extension         2-3 toe extension         Eccentric heel raises         Ankle inversion x BTB 3x15 BTB 3x15 BTB 3x15 BTB 20x + DF 20x    Ankle eversion  BTB 3x15 BTB 3x15 BTB 3x15 BTB 20x + PF 20x     Ankle pumps/circles PRN        Leg press         Forward step downs  x10 6\" step LLE 2x10 6\" LLE  2x10 6\" LLE  2x10 6\" LLE                               NEUROMUSCULAR REEDUCATION          SLS x 5x10s w/ doming 5x10s w/ doming  5x10s w/ doming  3x30s w/ doming    Tandem stance  2x30 sec each 2x30 sec ea  2x30 sec ea  2x30s ea     Rocker pose  x30 sec each X30 sec ea  X30 sec ea      Rocker board  60 sec ea AP/ML 60 sec ea AP/ML 60 sec ea AP/ML 20x AP/ML ea on round rockerboard    Ladder drills   Various drills, various speeds X10' various patterns (added in-in-out-out forward and laterally) X10' various patterns (added in-in-out-out forward and laterally)                                                                                      THERAPEUTIC ACTIVITY                                             GAIT TRAINING                                             MODALITIES                             Access Code: [email or web address]  URL: [email or web address]  Date: 06/12/2025  Prepared by: Quinn Reyes    Exercises  - Long Sitting Ankle Dorsiflexion with Anchored Resistance  - 1 x daily - 7 x weekly - 3 sets - 15 reps  - Long Sitting Ankle Plantar Flexion with " Resistance  - 1 x daily - 3-4 x weekly - 3 sets - 15 reps  - Long Sitting Ankle Inversion with Resistance  - 1 x daily - 3-4 x weekly - 3 sets - 15 reps  - Long Sitting Ankle Eversion with Resistance  - 1 x daily - 3-4 x weekly - 3 sets - 15 reps  - Standing Ankle Dorsiflexion Stretch on Chair  - 1 x daily - 3-4 x weekly - 2 sets - 10 reps - 5 hold  - Forward Step Down with Heel Tap and Counter Support  - 1 x daily - 3-4 x weekly - 2 sets - 10 reps  - Seated Arch Lifts  - 1 x daily - 3-4 x weekly - 1 sets - 10 reps - 5 hold  - Single Leg Stance  - 1 x daily - 3-4 x weekly - 1 sets - 3 reps - 30 hold  - Standing Tandem Balance with Counter Support  - 1 x daily - 3-4 x weekly - 1 sets - 2 reps - 30 hold